# Patient Record
Sex: MALE | Race: WHITE | NOT HISPANIC OR LATINO | Employment: OTHER | URBAN - METROPOLITAN AREA
[De-identification: names, ages, dates, MRNs, and addresses within clinical notes are randomized per-mention and may not be internally consistent; named-entity substitution may affect disease eponyms.]

---

## 2017-03-30 ENCOUNTER — OFFICE VISIT (OUTPATIENT)
Dept: FAMILY MEDICINE | Facility: CLINIC | Age: 67
End: 2017-03-30
Payer: COMMERCIAL

## 2017-03-30 VITALS
HEART RATE: 68 BPM | HEIGHT: 67 IN | DIASTOLIC BLOOD PRESSURE: 80 MMHG | BODY MASS INDEX: 28.41 KG/M2 | TEMPERATURE: 97.4 F | WEIGHT: 181 LBS | SYSTOLIC BLOOD PRESSURE: 110 MMHG | RESPIRATION RATE: 16 BRPM

## 2017-03-30 DIAGNOSIS — Z13.6 SCREENING FOR ABDOMINAL AORTIC ANEURYSM: ICD-10-CM

## 2017-03-30 DIAGNOSIS — Z00.00 ROUTINE GENERAL MEDICAL EXAMINATION AT A HEALTH CARE FACILITY: ICD-10-CM

## 2017-03-30 DIAGNOSIS — T63.444S BEE STING REACTION, UNDETERMINED INTENT, SEQUELA: ICD-10-CM

## 2017-03-30 DIAGNOSIS — Z11.59 NEED FOR HEPATITIS C SCREENING TEST: Primary | ICD-10-CM

## 2017-03-30 DIAGNOSIS — Z12.5 SCREENING FOR PROSTATE CANCER: ICD-10-CM

## 2017-03-30 LAB
ALBUMIN SERPL-MCNC: 3.7 G/DL (ref 3.4–5)
ALP SERPL-CCNC: 73 U/L (ref 40–150)
ALT SERPL W P-5'-P-CCNC: 21 U/L (ref 0–70)
ANION GAP SERPL CALCULATED.3IONS-SCNC: 5 MMOL/L (ref 3–14)
AST SERPL W P-5'-P-CCNC: 17 U/L (ref 0–45)
BILIRUB SERPL-MCNC: 0.7 MG/DL (ref 0.2–1.3)
BUN SERPL-MCNC: 19 MG/DL (ref 7–30)
CALCIUM SERPL-MCNC: 8.6 MG/DL (ref 8.5–10.1)
CHLORIDE SERPL-SCNC: 103 MMOL/L (ref 94–109)
CO2 SERPL-SCNC: 30 MMOL/L (ref 20–32)
CREAT SERPL-MCNC: 0.96 MG/DL (ref 0.66–1.25)
GFR SERPL CREATININE-BSD FRML MDRD: 78 ML/MIN/1.7M2
GLUCOSE SERPL-MCNC: 96 MG/DL (ref 70–99)
HCV AB SERPL QL IA: NORMAL
POTASSIUM SERPL-SCNC: 4.4 MMOL/L (ref 3.4–5.3)
PROT SERPL-MCNC: 7.1 G/DL (ref 6.8–8.8)
PSA SERPL-ACNC: 2.42 UG/L (ref 0–4)
SODIUM SERPL-SCNC: 138 MMOL/L (ref 133–144)

## 2017-03-30 PROCEDURE — G0103 PSA SCREENING: HCPCS | Performed by: FAMILY MEDICINE

## 2017-03-30 PROCEDURE — 80053 COMPREHEN METABOLIC PANEL: CPT | Performed by: FAMILY MEDICINE

## 2017-03-30 PROCEDURE — 86803 HEPATITIS C AB TEST: CPT | Performed by: FAMILY MEDICINE

## 2017-03-30 PROCEDURE — 36415 COLL VENOUS BLD VENIPUNCTURE: CPT | Performed by: FAMILY MEDICINE

## 2017-03-30 PROCEDURE — 99397 PER PM REEVAL EST PAT 65+ YR: CPT | Performed by: FAMILY MEDICINE

## 2017-03-30 RX ORDER — EPINEPHRINE 0.3 MG/.3ML
0.3 INJECTION SUBCUTANEOUS PRN
Qty: 0.6 ML | Refills: 1 | Status: SHIPPED | OUTPATIENT
Start: 2017-03-30 | End: 2024-05-09

## 2017-03-30 NOTE — PROGRESS NOTES
SUBJECTIVE:                                                            Steffen Mitchell is a 66 year old male who presents for Preventive Visit. He comes in today for routine health examination he is doing quite well. We've gone over his current medications talked about laboratory studies today and gone overall his health maintenance.  click delete button to remove this line now  click delete button to remove this line now  Are you in the first 12 months of your Medicare Part B coverage?      Healthy Habits:  Annual Exam:  Getting at least 3 servings of Calcium per day:: Yes  Bi-annual eye exam:: Yes  Dental care twice a year:: Yes  Sleep apnea or symptoms of sleep apnea:: None  Diet:: Regular (no restrictions)  Frequency of exercise:: 2-3 days/week  Taking medications regularly:: Yes  Medication side effects:: Not applicable  Additional concerns today:: No  Q1: Little interest or pleasure in doing things: 0=Not at all  Q2: Feeling down, depressed or hopeless: 0=Not at all  PHQ-2 Score: 0  Duration of exercise:: 45-60 minutes    COGNITIVE SCREEN  1) Repeat 3 items (Banana, Sunrise, Chair)    2) Clock draw: NORMAL  3) 3 item recall:   Recalls 3 objects  Results: 3 items recalled: COGNITIVE IMPAIRMENT LESS LIKELY    Mini-CogTM Copyright S Olivier. Licensed by the author for use in [facility]; reprinted with permission (soob@.[email or web address]). All rights reserved.                Reviewed and updated as needed this visit by clinical staff  Tobacco  Allergies         Reviewed and updated as needed this visit by Provider        Social History   Substance Use Topics     Smoking status: Former Smoker     Years: 10.00     Quit date: 1/1/1975     Smokeless tobacco: Former User     Quit date: 1/1/1980     Alcohol use Yes      Comment: occ has wine with dinner       The patient does not drink >3 drinks per day nor >7 drinks per week.    Today's PHQ-2 Score:   PHQ-2 ( 1999 [employer]) 3/30/2017 3/27/2017   Q1: Little interest or  "pleasure in doing things 0 -   Q2: Feeling down, depressed or hopeless 0 -   PHQ-2 Score 0 -   Little interest or pleasure in doing things - Not at all   Feeling down, depressed or hopeless - Not at all   PHQ-2 Score - 0       Do you feel safe in your environment - Yes    Do you have a Health Care Directive?: yes    Current providers sharing in care for this patient include:   Patient Care Team:  Renny Rosado MD as PCP - General (Family Practice)      Hearing impairment: Yes,     Ability to successfully perform activities of daily living: Yes, no assistance needed     Fall risk:  Fallen 2 or more times in the past year?: No  Any fall with injury in the past year?: No    Home safety:  discussed      The following health maintenance items are reviewed in Epic and correct as of today:  Health Maintenance   Topic Date Due     HEPATITIS C SCREENING  06/01/1968     FALL RISK ASSESSMENT  06/01/2015     AORTIC ANEURYSM SCREENING (SYSTEM ASSIGNED)  06/01/2015     TETANUS IMMUNIZATION (SYSTEM ASSIGNED)  05/06/2017     ADVANCE DIRECTIVE PLANNING Q5 YRS (NO INBASKET)  07/11/2017     INFLUENZA VACCINE (SYSTEM ASSIGNED)  09/01/2017     LIPID SCREEN Q5 YR MALE (SYSTEM ASSIGNED)  05/05/2020     COLONOSCOPY Q5 YR INBASKET MESSAGE  05/13/2020     PNEUMOCOCCAL  Completed              ROS:  C: NEGATIVE for fever, chills, change in weight  E/M: NEGATIVE for ear, mouth and throat problems  R: NEGATIVE for significant cough or SOB  CV: NEGATIVE for chest pain, palpitations or peripheral edema    BP Readings from Last 3 Encounters:   03/30/17 110/80   09/16/16 137/82   09/11/16 (!) 151/97    Wt Readings from Last 3 Encounters:   03/30/17 181 lb (82.1 kg)   09/16/16 191 lb (86.6 kg)   05/13/15 185 lb (83.9 kg)                  OBJECTIVE:                                                            /80  Pulse 68  Temp 97.4  F (36.3  C) (Tympanic)  Resp 16  Ht 5' 7\" (1.702 m)  Wt 181 lb (82.1 kg)  BMI 28.35 kg/m2 Estimated " "body mass index is 28.35 kg/(m^2) as calculated from the following:    Height as of this encounter: 5' 7\" (1.702 m).    Weight as of this encounter: 181 lb (82.1 kg).  EXAM:   GENERAL: healthy, alert and no distress  NECK: no adenopathy, no asymmetry, masses, or scars and thyroid normal to palpation  RESP: lungs clear to auscultation - no rales, rhonchi or wheezes  CV: regular rate and rhythm, normal S1 S2, no S3 or S4, no murmur, click or rub, no peripheral edema and peripheral pulses strong  ABDOMEN: soft, nontender, no hepatosplenomegaly, no masses and bowel sounds normal  MS: no gross musculoskeletal defects noted, no edema    ASSESSMENT / PLAN:                                                            1. Need for hepatitis C screening test      2. Screening for prostate cancer   - Hepatitis C Screen Reflex to HCV RNA Quant and Genotype  - Comprehensive metabolic panel  - PSA, screen  - US AORTIC IMAGING [[id number]]; Future    3. Screening for abdominal aortic aneurysm      4. Bee sting reaction, undetermined intent, sequela    - EPINEPHrine (ADRENACLICK) 0.3 MG/0.3ML injection; Inject 0.3 mLs (0.3 mg) into the muscle as needed for anaphylaxis  Dispense: 0.6 mL; Refill: 1    End of Life Planning:  Patient currently has an advanced directive:     COUNSELING:  ASSESSMENT/PLAN:      ICD-10-CM    1. Need for hepatitis C screening test Z11.59    2. Screening for prostate cancer Z12.5 Hepatitis C Screen Reflex to HCV RNA Quant and Genotype     Comprehensive metabolic panel     PSA, screen     US AORTIC IMAGING [[id number]]   3. Screening for abdominal aortic aneurysm Z13.6    4. Bee sting reaction, undetermined intent, sequela T63.444S EPINEPHrine (ADRENACLICK) 0.3 MG/0.3ML injection       Patient Instructions     Preventive Health Recommendations:   Male Ages 65 and over    Yearly exam:             See your health care provider every year in order to  o   Review health changes.   o   Discuss preventive care.    o   " Review your medicines if your doctor has prescribed any.    Talk with your health care provider about whether you should have a test to screen for prostate cancer (PSA).    Every 3 years, have a diabetes test (fasting glucose). If you are at risk for diabetes, you should have this test more often.    Every 5 years, have a cholesterol test. Have this test more often if you are at risk for high cholesterol or heart disease.     Every 10 years, have a colonoscopy. Or, have a yearly FIT test (stool test). These exams will check for colon cancer.    Talk to with your health care provider about screening for Abdominal Aortic Aneurysm if you have a family history of AAA or have a history of smoking.    Shots:     Get a flu shot each year.     Get a tetanus shot every 10 years.     Talk to your doctor about your pneumonia vaccines. There are now two you should receive - Pneumovax (PPSV 23) and Prevnar (PCV 13).     Talk to your doctor about a shingles vaccine.     Talk to your doctor about the hepatitis B vaccine.  Nutrition:     Eat at least 5 servings of fruits and vegetables each day.     Eat whole-grain bread, whole-wheat pasta and brown rice instead of white grains and rice.     Talk to your provider about Calcium and Vitamin D.   Lifestyle    Exercise for at least 150 minutes a week (30 minutes a day, 5 days a week). This will help you control your weight and prevent disease.     Limit alcohol to one drink per day.     No smoking.     Wear sunscreen to prevent skin cancer.     See your dentist every six months for an exam and cleaning.     See your eye doctor every 1 to 2 years to screen for conditions such as glaucoma, macular degeneration, cataracts, etc   Preventive Health Recommendations:       Male Ages 65 and over    Yearly exam:             See your health care provider every year in order to  o   Review health changes.   o   Discuss preventive care.    o   Review your medicines if your doctor has prescribed  "any.  Talk with your health care provider about whether you should have a test to screen for prostate cancer (PSA).  Every 3 years, have a diabetes test (fasting glucose). If you are at risk for diabetes, you should have this test more often.  Every 5 years, have a cholesterol test. Have this test more often if you are at risk for high cholesterol or heart disease.   Every 10 years, have a colonoscopy. Or, have a yearly FIT test (stool test). These exams will check for colon cancer.  Talk to with your health care provider about screening for Abdominal Aortic Aneurysm if you have a family history of AAA or have a history of smoking.  Shots:   Get a flu shot each year.   Get a tetanus shot every 10 years.   Talk to your doctor about your pneumonia vaccines. There are now two you should receive - Pneumovax (PPSV 23) and Prevnar (PCV 13).  Talk to your doctor about a shingles vaccine.   Talk to your doctor about the hepatitis B vaccine.  Nutrition:   Eat at least 5 servings of fruits and vegetables each day.   Eat whole-grain bread, whole-wheat pasta and brown rice instead of white grains and rice.   Talk to your doctor about Calcium and Vitamin D.   Lifestyle  Exercise for at least 150 minutes a week (30 minutes a day, 5 days a week). This will help you control your weight and prevent disease.   Limit alcohol to one drink per day.   No smoking.   Wear sunscreen to prevent skin cancer.   See your dentist every six months for an exam and cleaning.   See your eye doctor every 1 to 2 years to screen for conditions such as glaucoma, macular degeneration and cataracts.  Your exam is normal.  I will call on labs  Come back one year  Get AAA screen done               Estimated body mass index is 28.35 kg/(m^2) as calculated from the following:    Height as of this encounter: 5' 7\" (1.702 m).    Weight as of this encounter: 181 lb (82.1 kg).     reports that he quit smoking about 42 years ago. He quit after 10.00 years of use. " He quit smokeless tobacco use about 37 years ago.      Appropriate preventive services were discussed with this patient, including applicable screening as appropriate for cardiovascular disease, diabetes, osteopenia/osteoporosis, and glaucoma.  As appropriate for age/gender, discussed screening for colorectal cancer, prostate cancer, breast cancer, and cervical cancer. Checklist reviewing preventive services available has been given to the patient.    Reviewed patients plan of care and provided an AVS. The  for Steffen meets the Care Plan requirement. This Care Plan has been established and reviewed with the     Counseling Resources:  ATP IV Guidelines  Pooled Cohorts Equation Calculator  Breast Cancer Risk Calculator  FRAX Risk Assessment  ICSI Preventive Guidelines  Dietary Guidelines for Americans, 2010  [employer]'s MyPlate  ASA Prophylaxis  Lung CA Screening    Renny Garcia MD  [facility]  Answers for HPI/ROS submitted by the patient on 3/27/2017   Annual Exam:  Getting at least 3 servings of Calcium per day:: Yes  Bi-annual eye exam:: Yes  Dental care twice a year:: Yes  Sleep apnea or symptoms of sleep apnea:: None  Diet:: Regular (no restrictions)  Frequency of exercise:: 2-3 days/week  Taking medications regularly:: Yes  Medication side effects:: Not applicable  Additional concerns today:: No  Q1: Little interest or pleasure in doing things: 0=Not at all  Q2: Feeling down, depressed or hopeless: 0=Not at all  PHQ-2 Score: 0  Duration of exercise:: 45-60 minutes

## 2017-03-30 NOTE — PATIENT INSTRUCTIONS
Preventive Health Recommendations:   Male Ages 65 and over    Yearly exam:             See your health care provider every year in order to  o   Review health changes.   o   Discuss preventive care.    o   Review your medicines if your doctor has prescribed any.    Talk with your health care provider about whether you should have a test to screen for prostate cancer (PSA).    Every 3 years, have a diabetes test (fasting glucose). If you are at risk for diabetes, you should have this test more often.    Every 5 years, have a cholesterol test. Have this test more often if you are at risk for high cholesterol or heart disease.     Every 10 years, have a colonoscopy. Or, have a yearly FIT test (stool test). These exams will check for colon cancer.    Talk to with your health care provider about screening for Abdominal Aortic Aneurysm if you have a family history of AAA or have a history of smoking.    Shots:     Get a flu shot each year.     Get a tetanus shot every 10 years.     Talk to your doctor about your pneumonia vaccines. There are now two you should receive - Pneumovax (PPSV 23) and Prevnar (PCV 13).     Talk to your doctor about a shingles vaccine.     Talk to your doctor about the hepatitis B vaccine.  Nutrition:     Eat at least 5 servings of fruits and vegetables each day.     Eat whole-grain bread, whole-wheat pasta and brown rice instead of white grains and rice.     Talk to your provider about Calcium and Vitamin D.   Lifestyle    Exercise for at least 150 minutes a week (30 minutes a day, 5 days a week). This will help you control your weight and prevent disease.     Limit alcohol to one drink per day.     No smoking.     Wear sunscreen to prevent skin cancer.     See your dentist every six months for an exam and cleaning.     See your eye doctor every 1 to 2 years to screen for conditions such as glaucoma, macular degeneration, cataracts, etc   Preventive Health Recommendations:       Male Ages 65  and over    Yearly exam:             See your health care provider every year in order to  o   Review health changes.   o   Discuss preventive care.    o   Review your medicines if your doctor has prescribed any.  Talk with your health care provider about whether you should have a test to screen for prostate cancer (PSA).  Every 3 years, have a diabetes test (fasting glucose). If you are at risk for diabetes, you should have this test more often.  Every 5 years, have a cholesterol test. Have this test more often if you are at risk for high cholesterol or heart disease.   Every 10 years, have a colonoscopy. Or, have a yearly FIT test (stool test). These exams will check for colon cancer.  Talk to with your health care provider about screening for Abdominal Aortic Aneurysm if you have a family history of AAA or have a history of smoking.  Shots:   Get a flu shot each year.   Get a tetanus shot every 10 years.   Talk to your doctor about your pneumonia vaccines. There are now two you should receive - Pneumovax (PPSV 23) and Prevnar (PCV 13).  Talk to your doctor about a shingles vaccine.   Talk to your doctor about the hepatitis B vaccine.  Nutrition:   Eat at least 5 servings of fruits and vegetables each day.   Eat whole-grain bread, whole-wheat pasta and brown rice instead of white grains and rice.   Talk to your doctor about Calcium and Vitamin D.   Lifestyle  Exercise for at least 150 minutes a week (30 minutes a day, 5 days a week). This will help you control your weight and prevent disease.   Limit alcohol to one drink per day.   No smoking.   Wear sunscreen to prevent skin cancer.   See your dentist every six months for an exam and cleaning.   See your eye doctor every 1 to 2 years to screen for conditions such as glaucoma, macular degeneration and cataracts.  Your exam is normal.  I will call on labs  Come back one year  Get AAA screen done

## 2017-03-30 NOTE — MR AVS SNAPSHOT
After Visit Summary   3/30/2017    Steffen Mitchell    MRN: [id number]           Patient Information     Date Of Birth          1950        Visit Information        Provider Department      3/30/2017 8:40 AM Renny Garcia MD [facility]        Today's Diagnoses     Need for hepatitis C screening test    -  1    Screening for prostate cancer        Screening for abdominal aortic aneurysm          Care Instructions      Preventive Health Recommendations:   Male Ages 65 and over    Yearly exam:             See your health care provider every year in order to  o   Review health changes.   o   Discuss preventive care.    o   Review your medicines if your doctor has prescribed any.    Talk with your health care provider about whether you should have a test to screen for prostate cancer (PSA).    Every 3 years, have a diabetes test (fasting glucose). If you are at risk for diabetes, you should have this test more often.    Every 5 years, have a cholesterol test. Have this test more often if you are at risk for high cholesterol or heart disease.     Every 10 years, have a colonoscopy. Or, have a yearly FIT test (stool test). These exams will check for colon cancer.    Talk to with your health care provider about screening for Abdominal Aortic Aneurysm if you have a family history of AAA or have a history of smoking.    Shots:     Get a flu shot each year.     Get a tetanus shot every 10 years.     Talk to your doctor about your pneumonia vaccines. There are now two you should receive - Pneumovax (PPSV 23) and Prevnar (PCV 13).     Talk to your doctor about a shingles vaccine.     Talk to your doctor about the hepatitis B vaccine.  Nutrition:     Eat at least 5 servings of fruits and vegetables each day.     Eat whole-grain bread, whole-wheat pasta and brown rice instead of white grains and rice.     Talk to your provider about Calcium and Vitamin D.   Lifestyle    Exercise for  at least 150 minutes a week (30 minutes a day, 5 days a week). This will help you control your weight and prevent disease.     Limit alcohol to one drink per day.     No smoking.     Wear sunscreen to prevent skin cancer.     See your dentist every six months for an exam and cleaning.     See your eye doctor every 1 to 2 years to screen for conditions such as glaucoma, macular degeneration, cataracts, etc   Preventive Health Recommendations:       Male Ages 65 and over    Yearly exam:             See your health care provider every year in order to  o   Review health changes.   o   Discuss preventive care.    o   Review your medicines if your doctor has prescribed any.  Talk with your health care provider about whether you should have a test to screen for prostate cancer (PSA).  Every 3 years, have a diabetes test (fasting glucose). If you are at risk for diabetes, you should have this test more often.  Every 5 years, have a cholesterol test. Have this test more often if you are at risk for high cholesterol or heart disease.   Every 10 years, have a colonoscopy. Or, have a yearly FIT test (stool test). These exams will check for colon cancer.  Talk to with your health care provider about screening for Abdominal Aortic Aneurysm if you have a family history of AAA or have a history of smoking.  Shots:   Get a flu shot each year.   Get a tetanus shot every 10 years.   Talk to your doctor about your pneumonia vaccines. There are now two you should receive - Pneumovax (PPSV 23) and Prevnar (PCV 13).  Talk to your doctor about a shingles vaccine.   Talk to your doctor about the hepatitis B vaccine.  Nutrition:   Eat at least 5 servings of fruits and vegetables each day.   Eat whole-grain bread, whole-wheat pasta and brown rice instead of white grains and rice.   Talk to your doctor about Calcium and Vitamin D.   Lifestyle  Exercise for at least 150 minutes a week (30 minutes a day, 5 days a week). This will help you  control your weight and prevent disease.   Limit alcohol to one drink per day.   No smoking.   Wear sunscreen to prevent skin cancer.   See your dentist every six months for an exam and cleaning.   See your eye doctor every 1 to 2 years to screen for conditions such as glaucoma, macular degeneration and cataracts.  Your exam is normal.  I will call on labs  Come back one year  Get AAA screen done         Follow-ups after your visit        Future tests that were ordered for you today     Open Future Orders        Priority Expected Expires Ordered    US AORTIC IMAGING [[id number]] Routine  3/30/2018 3/30/2017            Who to contact     If you have questions or need follow up information about today's clinic visit or your schedule please contact [facility] directly at [phone].  Normal or non-critical lab and imaging results will be communicated to you by MyChart, letter or phone within 4 business days after the clinic has received the results. If you do not hear from us within 7 days, please contact the clinic through AllSource Analysishart or phone. If you have a critical or abnormal lab result, we will notify you by phone as soon as possible.  Submit refill requests through NextInput or call your pharmacy and they will forward the refill request to us. Please allow 3 business days for your refill to be completed.          Additional Information About Your Visit        AllSource Analysisharebookpie Information     NextInput gives you secure access to your electronic health record. If you see a primary care provider, you can also send messages to your care team and make appointments. If you have questions, please call your primary care clinic.  If you do not have a primary care provider, please call [phone] and they will assist you.        Care EveryWhere ID     This is your Care EveryWhere ID. This could be used by other organizations to access your [facility] medical records  [id number]        Your Vitals Were     Pulse  "Temperature Respirations Height BMI (Body Mass Index)       68 97.4  F (36.3  C) (Tympanic) 16 5' 7\" (1.702 m) 28.35 kg/m2        Blood Pressure from Last 3 Encounters:   03/30/17 110/80   09/16/16 137/82   09/11/16 (!) 151/97    Weight from Last 3 Encounters:   03/30/17 181 lb (82.1 kg)   09/16/16 191 lb (86.6 kg)   05/13/15 185 lb (83.9 kg)              We Performed the Following     Comprehensive metabolic panel     Hepatitis C Screen Reflex to HCV RNA Quant and Genotype     PSA, screen          Today's Medication Changes          These changes are accurate as of: 3/30/17  9:34 AM.  If you have any questions, ask your nurse or doctor.               These medicines have changed or have updated prescriptions.        Dose/Directions    ciclopirox 0.77 % cream   Commonly known as:  LOPROX   This may have changed:    - when to take this  - reasons to take this  - additional instructions   Used for:  Dermatitis, seborrheic, Bee allergy status, Bee sting allergy        Apply topically 2 times daily Apply to sites on face at bedtime   Quantity:  90 g   Refills:  3       EPIPEN 2-BON 0.3 MG/0.3ML injection   This may have changed:  additional instructions   Used for:  Bee sting allergy, Dermatitis, seborrheic, Bee allergy status   Generic drug:  EPINEPHrine        Dose:  0.3 mg   Inject 0.3 mLs (0.3 mg) into the muscle once as needed for anaphylaxis   Quantity:  1 each   Refills:  6                Primary Care Provider Office Phone # Fax #    Renny Rosado [phone]       [facility] [address] [address]        Thank you!     Thank you for choosing [facility]  for your care. Our goal is always to provide you with excellent care. Hearing back from our patients is one way we can continue to improve our services. Please take a few minutes to complete the written survey that you may receive in the mail after your visit with us. Thank you!             Your " Updated Medication List - Protect others around you: Learn how to safely use, store and throw away your medicines at www.disposemymeds.org.          This list is accurate as of: 3/30/17  9:34 AM.  Always use your most recent med list.                   Brand Name Dispense Instructions for use    ALEVE 220 MG tablet   Generic drug:  naproxen sodium      1 TABLET EVERY 12 HOURS AS NEEDED       aspirin 81 MG tablet     100    1 tab po QD (Once per day)       ciclopirox 0.77 % cream    LOPROX    90 g    Apply topically 2 times daily Apply to sites on face at bedtime       clobetasol 0.05 % external solution    TEMOVATE    100 mL    Apply topically daily Apply to site son scalp daily       EPIPEN 2-BON 0.3 MG/0.3ML injection   Generic drug:  EPINEPHrine     1 each    Inject 0.3 mLs (0.3 mg) into the muscle once as needed for anaphylaxis       ketoconazole 2 % shampoo    NIZORAL    240 mL    Apply to the affected area and wash off after 5 minutes.

## 2017-10-26 ENCOUNTER — ALLIED HEALTH/NURSE VISIT (OUTPATIENT)
Dept: FAMILY MEDICINE | Facility: CLINIC | Age: 67
End: 2017-10-26
Payer: COMMERCIAL

## 2017-10-26 DIAGNOSIS — Z23 NEED FOR PROPHYLACTIC VACCINATION AND INOCULATION AGAINST INFLUENZA: Primary | ICD-10-CM

## 2017-10-26 PROCEDURE — 99207 ZZC NO CHARGE LOS: CPT

## 2017-10-26 PROCEDURE — 90662 IIV NO PRSV INCREASED AG IM: CPT

## 2017-10-26 PROCEDURE — G0008 ADMIN INFLUENZA VIRUS VAC: HCPCS

## 2017-10-26 NOTE — MR AVS SNAPSHOT
After Visit Summary   10/26/2017    Steffen Mitchell    MRN: [id number]           Patient Information     Date Of Birth          1950        Visit Information        Provider Department      10/26/2017 3:10 PM [facility] FLU SHOT [facility]        Today's Diagnoses     Need for prophylactic vaccination and inoculation against influenza    -  1       Follow-ups after your visit        Who to contact     If you have questions or need follow up information about today's clinic visit or your schedule please contact [facility] directly at [phone].  Normal or non-critical lab and imaging results will be communicated to you by ScanDigitalhart, letter or phone within 4 business days after the clinic has received the results. If you do not hear from us within 7 days, please contact the clinic through BonaYout or phone. If you have a critical or abnormal lab result, we will notify you by phone as soon as possible.  Submit refill requests through BAUNAT or call your pharmacy and they will forward the refill request to us. Please allow 3 business days for your refill to be completed.          Additional Information About Your Visit        MyChart Information     BAUNAT gives you secure access to your electronic health record. If you see a primary care provider, you can also send messages to your care team and make appointments. If you have questions, please call your primary care clinic.  If you do not have a primary care provider, please call [phone] and they will assist you.        Care EveryWhere ID     This is your Care EveryWhere ID. This could be used by other organizations to access your [facility] medical records  [id number]         Blood Pressure from Last 3 Encounters:   03/30/17 110/80   09/16/16 137/82   09/11/16 (!) 151/97    Weight from Last 3 Encounters:   03/30/17 181 lb (82.1 kg)   09/16/16 191 lb (86.6 kg)   05/13/15 185 lb (83.9 kg)              We Performed the  Following     ADMIN INFLUENZA (For MEDICARE Patients ONLY) []     FLU VACCINE, INCREASED ANTIGEN, PRESV FREE, AGE 65+ [34165]          Today's Medication Changes          These changes are accurate as of: 10/26/17  3:11 PM.  If you have any questions, ask your nurse or doctor.               These medicines have changed or have updated prescriptions.        Dose/Directions    ciclopirox 0.77 % cream   Commonly known as:  LOPROX   This may have changed:    - when to take this  - reasons to take this  - additional instructions   Used for:  Dermatitis, seborrheic, Bee allergy status, Bee sting allergy        Apply topically 2 times daily Apply to sites on face at bedtime   Quantity:  90 g   Refills:  3       * EPIPEN 2-BON 0.3 MG/0.3ML injection 2-pack   This may have changed:  additional instructions   Used for:  Bee sting allergy, Dermatitis, seborrheic, Bee allergy status   Generic drug:  EPINEPHrine        Dose:  0.3 mg   Inject 0.3 mLs (0.3 mg) into the muscle once as needed for anaphylaxis   Quantity:  1 each   Refills:  6       * EPINEPHrine 0.3 MG/0.3ML injection 2-pack   Commonly known as:  ADRENACLICK   This may have changed:  Another medication with the same name was changed. Make sure you understand how and when to take each.   Used for:  Bee sting reaction, undetermined intent, sequela        Dose:  0.3 mg   Inject 0.3 mLs (0.3 mg) into the muscle as needed for anaphylaxis   Quantity:  0.6 mL   Refills:  1       * Notice:  This list has 2 medication(s) that are the same as other medications prescribed for you. Read the directions carefully, and ask your doctor or other care provider to review them with you.             Primary Care Provider Office Phone # Fax #    Renny Rosado [phone] [address] [place] [address]        Equal Access to Services     LINDA ECHEVERRIA : alpesh Garrido qaybta kaalmada adeegyada, waxay idiin hayaan adeeg  ronievytmei dominguezaan ah. So [facility] [phone].    ATENCIÓN: Si scottla jorge, tiene a duncan disposición servicios gratuitos de asistencia lingüística. Gianfranco al [phone].    We comply with applicable federal civil rights laws and [place] laws. We do not discriminate on the basis of race, color, national origin, age, disability, sex, sexual orientation, or gender identity.            Thank you!     Thank you for choosing [facility]  for your care. Our goal is always to provide you with excellent care. Hearing back from our patients is one way we can continue to improve our services. Please take a few minutes to complete the written survey that you may receive in the mail after your visit with us. Thank you!             Your Updated Medication List - Protect others around you: Learn how to safely use, store and throw away your medicines at www.disposemymeds.org.          This list is accurate as of: 10/26/17  3:11 PM.  Always use your most recent med list.                   Brand Name Dispense Instructions for use Diagnosis    ALEVE 220 MG tablet   Generic drug:  naproxen sodium      1 TABLET EVERY 12 HOURS AS NEEDED        aspirin 81 MG tablet     100    1 tab po QD (Once per day)    Fam hx-cardiovas dis NEC       ciclopirox 0.77 % cream    LOPROX    90 g    Apply topically 2 times daily Apply to sites on face at bedtime    Dermatitis, seborrheic, Bee allergy status, Bee sting allergy       clobetasol 0.05 % external solution    TEMOVATE    100 mL    Apply topically daily Apply to site son scalp daily    Dermatitis, seborrheic, Bee allergy status, Bee sting allergy       * EPIPEN 2-BON 0.3 MG/0.3ML injection 2-pack   Generic drug:  EPINEPHrine     1 each    Inject 0.3 mLs (0.3 mg) into the muscle once as needed for anaphylaxis    Bee sting allergy, Dermatitis, seborrheic, Bee allergy status       * EPINEPHrine 0.3 MG/0.3ML injection 2-pack    ADRENACLICK    0.6 mL    Inject 0.3 mLs (0.3 mg) into the muscle  as needed for anaphylaxis    Bee sting reaction, undetermined intent, sequela       ketoconazole 2 % shampoo    NIZORAL    240 mL    Apply to the affected area and wash off after 5 minutes.    Dermatitis, seborrheic, Bee allergy status, Bee sting allergy       * Notice:  This list has 2 medication(s) that are the same as other medications prescribed for you. Read the directions carefully, and ask your doctor or other care provider to review them with you.

## 2017-10-26 NOTE — PROGRESS NOTES

## 2018-02-22 ENCOUNTER — RADIANT APPOINTMENT (OUTPATIENT)
Dept: ULTRASOUND IMAGING | Facility: CLINIC | Age: 68
End: 2018-02-22
Attending: FAMILY MEDICINE
Payer: COMMERCIAL

## 2018-02-22 DIAGNOSIS — Z12.5 SCREENING FOR PROSTATE CANCER: ICD-10-CM

## 2018-02-22 PROCEDURE — 76775 US EXAM ABDO BACK WALL LIM: CPT

## 2018-10-31 ENCOUNTER — ALLIED HEALTH/NURSE VISIT (OUTPATIENT)
Dept: FAMILY MEDICINE | Facility: CLINIC | Age: 68
End: 2018-10-31
Payer: COMMERCIAL

## 2018-10-31 DIAGNOSIS — Z23 NEED FOR PROPHYLACTIC VACCINATION AND INOCULATION AGAINST INFLUENZA: Primary | ICD-10-CM

## 2018-10-31 PROCEDURE — G0008 ADMIN INFLUENZA VIRUS VAC: HCPCS

## 2018-10-31 PROCEDURE — 90662 IIV NO PRSV INCREASED AG IM: CPT

## 2018-10-31 NOTE — MR AVS SNAPSHOT
After Visit Summary   10/31/2018    Steffen Mitchell    MRN: [id number]           Patient Information     Date Of Birth          1950        Visit Information        Provider Department      10/31/2018 10:30 AM [place] [facility]INA/LPN [facility]        Today's Diagnoses     Need for prophylactic vaccination and inoculation against influenza    -  1       Follow-ups after your visit        Who to contact     If you have questions or need follow up information about today's clinic visit or your schedule please contact [facility] directly at [phone].  Normal or non-critical lab and imaging results will be communicated to you by Watkins Hirehart, letter or phone within 4 business days after the clinic has received the results. If you do not hear from us within 7 days, please contact the clinic through Phraxist or phone. If you have a critical or abnormal lab result, we will notify you by phone as soon as possible.  Submit refill requests through cisimple or call your pharmacy and they will forward the refill request to us. Please allow 3 business days for your refill to be completed.          Additional Information About Your Visit        MyChart Information     cisimple gives you secure access to your electronic health record. If you see a primary care provider, you can also send messages to your care team and make appointments. If you have questions, please call your primary care clinic.  If you do not have a primary care provider, please call [phone] and they will assist you.        Care EveryWhere ID     This is your Care EveryWhere ID. This could be used by other organizations to access your [facility] medical records  [id number]         Blood Pressure from Last 3 Encounters:   03/30/17 110/80   09/16/16 137/82   09/11/16 (!) 151/97    Weight from Last 3 Encounters:   03/30/17 181 lb (82.1 kg)   09/16/16 191 lb (86.6 kg)   05/13/15 185 lb (83.9 kg)              We Performed  the Following     FLU VACCINE, INCREASED ANTIGEN, PRESV FREE, AGE 65+ [84731]     Vaccine Administration, Initial [52271]          Today's Medication Changes          These changes are accurate as of 10/31/18 11:16 AM.  If you have any questions, ask your nurse or doctor.               These medicines have changed or have updated prescriptions.        Dose/Directions    ciclopirox 0.77 % cream   Commonly known as:  LOPROX   This may have changed:    - when to take this  - reasons to take this  - additional instructions   Used for:  Dermatitis, seborrheic, Bee allergy status, Bee sting allergy        Apply topically 2 times daily Apply to sites on face at bedtime   Quantity:  90 g   Refills:  3       * EPIPEN 2-BON 0.3 MG/0.3ML injection 2-pack   This may have changed:  additional instructions   Used for:  Bee sting allergy, Dermatitis, seborrheic, Bee allergy status   Generic drug:  EPINEPHrine        Dose:  0.3 mg   Inject 0.3 mLs (0.3 mg) into the muscle once as needed for anaphylaxis   Quantity:  1 each   Refills:  6       * EPINEPHrine 0.3 MG/0.3ML injection 2-pack   Commonly known as:  ADRENACLICK   This may have changed:  Another medication with the same name was changed. Make sure you understand how and when to take each.   Used for:  Bee sting reaction, undetermined intent, sequela        Dose:  0.3 mg   Inject 0.3 mLs (0.3 mg) into the muscle as needed for anaphylaxis   Quantity:  0.6 mL   Refills:  1       * Notice:  This list has 2 medication(s) that are the same as other medications prescribed for you. Read the directions carefully, and ask your doctor or other care provider to review them with you.             Primary Care Provider Office Phone # Fax #    Renny Rosado [phone] [address] [place] [address]        Equal Access to Services     LINDA ECHEVERRIA AH: Halima Thomas, alpesh rivera, berna hernandez  lajuanito falcon. So [facility] [phone].    ATENCIÓN: Si habla jorge, tiene a duncan disposición servicios gratuitos de asistencia lingüística. Gianfranco campuzano [phone].    We comply with applicable federal civil rights laws and [place] laws. We do not discriminate on the basis of race, color, national origin, age, disability, sex, sexual orientation, or gender identity.            Thank you!     Thank you for choosing [facility]  for your care. Our goal is always to provide you with excellent care. Hearing back from our patients is one way we can continue to improve our services. Please take a few minutes to complete the written survey that you may receive in the mail after your visit with us. Thank you!             Your Updated Medication List - Protect others around you: Learn how to safely use, store and throw away your medicines at www.disposemymeds.org.          This list is accurate as of 10/31/18 11:16 AM.  Always use your most recent med list.                   Brand Name Dispense Instructions for use Diagnosis    ALEVE 220 MG tablet   Generic drug:  naproxen sodium      1 TABLET EVERY 12 HOURS AS NEEDED        aspirin 81 MG tablet     100    1 tab po QD (Once per day)    Fam hx-cardiovas dis NEC       ciclopirox 0.77 % cream    LOPROX    90 g    Apply topically 2 times daily Apply to sites on face at bedtime    Dermatitis, seborrheic, Bee allergy status, Bee sting allergy       clobetasol 0.05 % external solution    TEMOVATE    100 mL    Apply topically daily Apply to site son scalp daily    Dermatitis, seborrheic, Bee allergy status, Bee sting allergy       * EPIPEN 2-BON 0.3 MG/0.3ML injection 2-pack   Generic drug:  EPINEPHrine     1 each    Inject 0.3 mLs (0.3 mg) into the muscle once as needed for anaphylaxis    Bee sting allergy, Dermatitis, seborrheic, Bee allergy status       * EPINEPHrine 0.3 MG/0.3ML injection 2-pack    ADRENACLICK    0.6 mL    Inject 0.3 mLs (0.3 mg) into the muscle as  needed for anaphylaxis    Bee sting reaction, undetermined intent, sequela       ketoconazole 2 % shampoo    NIZORAL    240 mL    Apply to the affected area and wash off after 5 minutes.    Dermatitis, seborrheic, Bee allergy status, Bee sting allergy       * Notice:  This list has 2 medication(s) that are the same as other medications prescribed for you. Read the directions carefully, and ask your doctor or other care provider to review them with you.

## 2018-10-31 NOTE — PROGRESS NOTES
Injectable Influenza Immunization Documentation    1.  Is the person to be vaccinated sick today?   No    2. Does the person to be vaccinated have an allergy to a component   of the vaccine?   No  Egg Allergy Algorithm Link    3. Has the person to be vaccinated ever had a serious reaction   to influenza vaccine in the past?   No    4. Has the person to be vaccinated ever had Guillain-Barré syndrome?   No    Form completed by Jane Jacobo CMA    Prior to injection verified patient identity using patient's name and date of birth.  Due to injection administration, patient instructed to remain in clinic for 15 minutes  afterwards, and to report any adverse reaction to me immediately.    Jane Jacobo CMA

## 2019-02-12 ENCOUNTER — OFFICE VISIT (OUTPATIENT)
Dept: FAMILY MEDICINE | Facility: CLINIC | Age: 69
End: 2019-02-12
Payer: COMMERCIAL

## 2019-02-12 VITALS
HEIGHT: 68 IN | WEIGHT: 191 LBS | BODY MASS INDEX: 28.95 KG/M2 | HEART RATE: 68 BPM | RESPIRATION RATE: 18 BRPM | SYSTOLIC BLOOD PRESSURE: 136 MMHG | DIASTOLIC BLOOD PRESSURE: 88 MMHG | TEMPERATURE: 97.4 F

## 2019-02-12 DIAGNOSIS — Z13.220 LIPID SCREENING: Primary | ICD-10-CM

## 2019-02-12 DIAGNOSIS — Z91.030 BEE STING ALLERGY: ICD-10-CM

## 2019-02-12 DIAGNOSIS — Z12.5 SCREENING FOR PROSTATE CANCER: ICD-10-CM

## 2019-02-12 DIAGNOSIS — L21.9 DERMATITIS, SEBORRHEIC: ICD-10-CM

## 2019-02-12 DIAGNOSIS — Z91.030 BEE ALLERGY STATUS: ICD-10-CM

## 2019-02-12 LAB
ALBUMIN SERPL-MCNC: 3.9 G/DL (ref 3.4–5)
ALP SERPL-CCNC: 75 U/L (ref 40–150)
ALT SERPL W P-5'-P-CCNC: 33 U/L (ref 0–70)
ANION GAP SERPL CALCULATED.3IONS-SCNC: 4 MMOL/L (ref 3–14)
AST SERPL W P-5'-P-CCNC: 20 U/L (ref 0–45)
BILIRUB SERPL-MCNC: 0.6 MG/DL (ref 0.2–1.3)
BUN SERPL-MCNC: 16 MG/DL (ref 7–30)
CALCIUM SERPL-MCNC: 8.8 MG/DL (ref 8.5–10.1)
CHLORIDE SERPL-SCNC: 102 MMOL/L (ref 94–109)
CHOLEST SERPL-MCNC: 198 MG/DL
CO2 SERPL-SCNC: 29 MMOL/L (ref 20–32)
CREAT SERPL-MCNC: 1.01 MG/DL (ref 0.66–1.25)
GFR SERPL CREATININE-BSD FRML MDRD: 76 ML/MIN/{1.73_M2}
GLUCOSE SERPL-MCNC: 102 MG/DL (ref 70–99)
HDLC SERPL-MCNC: 41 MG/DL
LDLC SERPL CALC-MCNC: 116 MG/DL
NONHDLC SERPL-MCNC: 157 MG/DL
POTASSIUM SERPL-SCNC: 4.6 MMOL/L (ref 3.4–5.3)
PROT SERPL-MCNC: 7.4 G/DL (ref 6.8–8.8)
SODIUM SERPL-SCNC: 135 MMOL/L (ref 133–144)
TRIGL SERPL-MCNC: 206 MG/DL

## 2019-02-12 PROCEDURE — 99000 SPECIMEN HANDLING OFFICE-LAB: CPT | Performed by: FAMILY MEDICINE

## 2019-02-12 PROCEDURE — 80061 LIPID PANEL: CPT | Performed by: FAMILY MEDICINE

## 2019-02-12 PROCEDURE — 36415 COLL VENOUS BLD VENIPUNCTURE: CPT | Performed by: FAMILY MEDICINE

## 2019-02-12 PROCEDURE — G0438 PPPS, INITIAL VISIT: HCPCS | Performed by: FAMILY MEDICINE

## 2019-02-12 PROCEDURE — 84153 ASSAY OF PSA TOTAL: CPT | Mod: 90 | Performed by: FAMILY MEDICINE

## 2019-02-12 PROCEDURE — 80053 COMPREHEN METABOLIC PANEL: CPT | Performed by: FAMILY MEDICINE

## 2019-02-12 PROCEDURE — 84154 ASSAY OF PSA FREE: CPT | Mod: 90 | Performed by: FAMILY MEDICINE

## 2019-02-12 RX ORDER — CICLOPIROX OLAMINE 7.7 MG/G
CREAM TOPICAL 2 TIMES DAILY
Qty: 90 G | Refills: 3 | Status: SHIPPED | OUTPATIENT
Start: 2019-02-12 | End: 2023-10-26

## 2019-02-12 RX ORDER — KETOCONAZOLE 20 MG/ML
SHAMPOO TOPICAL
Qty: 240 ML | Refills: 11 | Status: SHIPPED | OUTPATIENT
Start: 2019-02-12 | End: 2023-10-26

## 2019-02-12 RX ORDER — CLOBETASOL PROPIONATE 0.5 MG/ML
SOLUTION TOPICAL DAILY
Qty: 100 ML | Refills: 3 | Status: SHIPPED | OUTPATIENT
Start: 2019-02-12 | End: 2023-10-26

## 2019-02-12 ASSESSMENT — MIFFLIN-ST. JEOR: SCORE: 1602.93

## 2019-02-12 NOTE — PROGRESS NOTES
"  SUBJECTIVE:   Steffen Mitchell is a 68 year old male who presents for Preventive Visit.       Are you in the first 12 months of your Medicare Part B coverage?  No    Physical Health:    In general, how would you rate your overall physical health? excellent    Outside of work, how many days during the week do you exercise? 2-3 days/week    Outside of work, approximately how many minutes a day do you exercise?    If you drink alcohol do you typically have >3 drinks per day or >7 drinks per week? No    Do you usually eat at least 4 servings of fruit and vegetables a day, include whole grains & fiber and avoid regularly eating high fat or \"junk\" foods? Yes    Do you have any problems taking medications regularly?  na    Do you have any side effects from medications? not applicable    Needs assistance for the following daily activities: no assistance needed    Which of the following safety concerns are present in your home?  discussed     Hearing impairment: Yes,     In the past 6 months, have you been bothered by leaking of urine? no    Mental Health:    In general, how would you rate your overall mental or emotional health? excellent  PHQ-2 Score:      Do you feel safe in your environment? YES    Do you have a Health Care Directive? Yes: Patient states has Advance Directive and will bring in a copy to clinic.    Additional concerns to address?      Fall risk:       Cognitive Screenin) Repeat 3 items (Leader, Season, Table)    2) Clock draw: NORMAL  3) 3 item recall: Recalls NO objects   Results: 0 items recalled: PROBABLE COGNITIVE IMPAIRMENT, **INFORM PROVIDER**    Mini-CogTM Copyright SUELLEN Aguayo. Licensed by the author for use in [facility]; reprinted with permission (olman@.[facility]). All rights reserved.      Do you have sleep apnea, excessive snoring or daytime drowsiness?: no            Reviewed and updated as needed this visit by clinical staff         Reviewed and updated as needed this visit by " Provider        Social History     Tobacco Use     Smoking status: Former Smoker     Years: 10.00     Last attempt to quit: 1975     Years since quittin.1     Smokeless tobacco: Former User     Quit date: 1980   Substance Use Topics     Alcohol use: Yes     Comment: occ has wine with dinner                           Current providers sharing in care for this patient include:   Patient Care Team:  Renny Rosado MD as PCP - General (Family Practice)  Renny Garcia MD as PCP - Assigned PCP    The following health maintenance items are reviewed in Epic and correct as of today:  Health Maintenance   Topic Date Due     ZOSTER IMMUNIZATION (1 of 2) 2000     DTAP/TDAP/TD IMMUNIZATION (2 - Td) 2009     ADVANCE DIRECTIVE PLANNING Q5 YRS  2017     FALL RISK ASSESSMENT  2018     PHQ-2 Q1 YR  2018     LIPID SCREEN Q5 YR MALE (SYSTEM ASSIGNED)  2020     COLONOSCOPY Q5 YR  2020     INFLUENZA VACCINE  Completed     PNEUMOCOCCAL IMMUNIZATION 65+ LOW/MEDIUM RISK  Completed     AORTIC ANEURYSM SCREENING (SYSTEM ASSIGNED)  Completed     HEPATITIS C SCREENING  Completed     IPV IMMUNIZATION  Aged Out     MENINGITIS IMMUNIZATION  Aged Out     Labs reviewed in EPIC  BP Readings from Last 3 Encounters:   19 136/88   17 110/80   16 137/82    Wt Readings from Last 3 Encounters:   19 86.6 kg (191 lb)   17 82.1 kg (181 lb)   16 86.6 kg (191 lb)                  Patient Active Problem List   Diagnosis     FAMILY HISTORY OF CARDIOVASC DIS (ISCHEMIC)     ALLERGY   **  SEE ALSO ALLERGIC REAC BEE STING     FAMILY HISTORY OF DIABETES MELLITUS     Sensorineural hearing loss     Subjective tinnitus     CARDIOVASCULAR SCREENING; LDL GOAL LESS THAN 160     Advanced directives, counseling/discussion     Family history of diabetes mellitus (DM)     Past Surgical History:   Procedure Laterality Date     COLONOSCOPY N/A 2015    Procedure:  COLONOSCOPY;  Surgeon: Carlos Ovalles MD;  Location: [facility]     SURGICAL HISTORY OF -   2003     Colonoscopy and polypectomy with snare     SURGICAL HISTORY OF -   1984     Arthroscoopy Arthroscopic medial meniscectomy Left Knee     SURGICAL HISTORY OF -   3/29/2002    Right Knee,partial medial meniscectomy        Social History     Tobacco Use     Smoking status: Former Smoker     Years: 10.00     Last attempt to quit: 1975     Years since quittin.1     Smokeless tobacco: Former User     Quit date: 1980   Substance Use Topics     Alcohol use: Yes     Comment: occ has wine with dinner     Family History   Problem Relation Age of Onset     Hypertension Mother      Cerebrovascular Disease Mother      Hypertension Father      Cerebrovascular Disease Father 70     Diabetes Father      Eye Disorder Father         glacoma     Coronary Artery Disease Father      C.A.D. Brother         bypass surgery at age 55     Cardiovascular Brother         pulmonary hypertention     Respiratory Brother         sleep apnea     Heart Disease Brother         triple bypass     Diabetes Brother          Current Outpatient Medications   Medication Sig Dispense Refill     ASPIRIN 81 MG OR TABS 1 tab po QD (Once per day) 100 3     ciclopirox (LOPROX) 0.77 % cream Apply topically 2 times daily Apply to sites on face at bedtime 90 g 3     clobetasol (TEMOVATE) 0.05 % external solution Apply topically daily Apply to site son scalp daily 100 mL 3     ketoconazole (NIZORAL) 2 % external shampoo Apply to the affected area and wash off after 5 minutes. 240 mL 11     ALEVE 220 MG OR TABS 1 TABLET EVERY 12 HOURS AS NEEDED       EPINEPHrine (ADRENACLICK) 0.3 MG/0.3ML injection Inject 0.3 mLs (0.3 mg) into the muscle as needed for anaphylaxis 0.6 mL 1     EPIPEN 2-BON 0.3 MG/0.3ML injection Inject 0.3 mLs (0.3 mg) into the muscle once as needed for anaphylaxis (Patient taking differently: Inject 0.3 mg into the muscle once  "as needed for anaphylaxis For bee stings) 1 each 6     Allergies   Allergen Reactions     Bees      Bee Stings           ROS:  CONSTITUTIONAL: NEGATIVE for fever, chills, change in weight  ENT/MOUTH: NEGATIVE for ear, mouth and throat problems  RESP: NEGATIVE for significant cough or SOB  CV: NEGATIVE for chest pain, palpitations or peripheral edema    OBJECTIVE:   There were no vitals taken for this visit. Estimated body mass index is 28.35 kg/m  as calculated from the following:    Height as of 3/30/17: 1.702 m (5' 7\").    Weight as of 3/30/17: 82.1 kg (181 lb).  EXAM:   GENERAL: healthy, alert and no distress  NECK: no adenopathy, no asymmetry, masses, or scars and thyroid normal to palpation  RESP: lungs clear to auscultation - no rales, rhonchi or wheezes  CV: regular rate and rhythm, normal S1 S2, no S3 or S4, no murmur, click or rub, no peripheral edema and peripheral pulses strong  ABDOMEN: soft, nontender, no hepatosplenomegaly, no masses and bowel sounds normal  MS: no gross musculoskeletal defects noted, no edema        ASSESSMENT / PLAN:   1. Lipid screening    - Lipid panel reflex to direct LDL Fasting  - Comprehensive metabolic panel    2. Screening for prostate cancer    - PSA, total and free    3. Dermatitis, seborrheic    - clobetasol (TEMOVATE) 0.05 % external solution; Apply topically daily Apply to site son scalp daily  Dispense: 100 mL; Refill: 3  - ketoconazole (NIZORAL) 2 % external shampoo; Apply to the affected area and wash off after 5 minutes.  Dispense: 240 mL; Refill: 11  - ciclopirox (LOPROX) 0.77 % cream; Apply topically 2 times daily Apply to sites on face at bedtime  Dispense: 90 g; Refill: 3    4. Bee allergy status    - clobetasol (TEMOVATE) 0.05 % external solution; Apply topically daily Apply to site son scalp daily  Dispense: 100 mL; Refill: 3  - ketoconazole (NIZORAL) 2 % external shampoo; Apply to the affected area and wash off after 5 minutes.  Dispense: 240 mL; Refill: 11  - " "ciclopirox (LOPROX) 0.77 % cream; Apply topically 2 times daily Apply to sites on face at bedtime  Dispense: 90 g; Refill: 3    5. Bee sting allergy    - clobetasol (TEMOVATE) 0.05 % external solution; Apply topically daily Apply to site son scalp daily  Dispense: 100 mL; Refill: 3  - ketoconazole (NIZORAL) 2 % external shampoo; Apply to the affected area and wash off after 5 minutes.  Dispense: 240 mL; Refill: 11  - ciclopirox (LOPROX) 0.77 % cream; Apply topically 2 times daily Apply to sites on face at bedtime  Dispense: 90 g; Refill: 3    End of Life Planning:  Patient currently has an advanced directive:     COUNSELING:      BP Readings from Last 1 Encounters:   03/30/17 110/80     Estimated body mass index is 28.35 kg/m  as calculated from the following:    Height as of 3/30/17: 1.702 m (5' 7\").    Weight as of 3/30/17: 82.1 kg (181 lb).           reports that he quit smoking about 44 years ago. He quit after 10.00 years of use. He quit smokeless tobacco use about 39 years ago.      Appropriate preventive services were discussed with this patient, including applicable screening as appropriate for cardiovascular disease, diabetes, osteopenia/osteoporosis, and glaucoma.  As appropriate for age/gender, discussed screening for colorectal cancer, prostate cancer, breast cancer, and cervical cancer. Checklist reviewing preventive services available has been given to the patient.    Reviewed patients plan of care and provided an AVS. The  heidi Bourne meets the Care Plan requirement. This Care Plan has been established and reviewed with the     Counseling Resources:  ATP IV Guidelines  Pooled Cohorts Equation Calculator  Breast Cancer Risk Calculator  FRAX Risk Assessment  ICSI Preventive Guidelines  Dietary Guidelines for Americans, 2010  USDA's MyPlate  ASA Prophylaxis  Lung CA Screening    Renny Garcia MD  [facility]  "

## 2019-02-12 NOTE — NURSING NOTE
"Chief Complaint   Patient presents with     Medicare Visit       Initial /88   Pulse 68   Temp 97.4  F (36.3  C) (Tympanic)   Resp 18   Ht 1.715 m (5' 7.5\")   Wt 86.6 kg (191 lb)   BMI 29.47 kg/m   Estimated body mass index is 29.47 kg/m  as calculated from the following:    Height as of this encounter: 1.715 m (5' 7.5\").    Weight as of this encounter: 86.6 kg (191 lb).    Patient presents to the clinic using     Health Maintenance that is potentially due pending provider review:          Is there anyone who you would like to be able to receive your results?   If yes have patient fill out BHAVANI    "

## 2019-02-12 NOTE — PATIENT INSTRUCTIONS
Preventive Health Recommendations:     See your health care provider every year to    Review health changes.     Discuss preventive care.      Review your medicines if your doctor has prescribed any.    Talk with your health care provider about whether you should have a test to screen for prostate cancer (PSA).    Every 3 years, have a diabetes test (fasting glucose). If you are at risk for diabetes, you should have this test more often.    Every 5 years, have a cholesterol test. Have this test more often if you are at risk for high cholesterol or heart disease.     Every 10 years, have a colonoscopy. Or, have a yearly FIT test (stool test). These exams will check for colon cancer.    Talk to with your health care provider about screening for Abdominal Aortic Aneurysm if you have a family history of AAA or have a history of smoking.  Shots:     Get a flu shot each year.     Get a tetanus shot every 10 years.     Talk to your doctor about your pneumonia vaccines. There are now two you should receive - Pneumovax (PPSV 23) and Prevnar (PCV 13).    Talk to your pharmacist about a shingles vaccine.     Talk to your doctor about the hepatitis B vaccine.  Nutrition:     Eat at least 5 servings of fruits and vegetables each day.     Eat whole-grain bread, whole-wheat pasta and brown rice instead of white grains and rice.     Get adequate Calcium and Vitamin D.   Lifestyle    Exercise for at least 150 minutes a week (30 minutes a day, 5 days a week). This will help you control your weight and prevent disease.     Limit alcohol to one drink per day.     No smoking.     Wear sunscreen to prevent skin cancer.     See your dentist every six months for an exam and cleaning.     See your eye doctor every 1 to 2 years to screen for conditions such as glaucoma, macular degeneration and cataracts.    Personalized Prevention Plan  You are due for the preventive services outlined below.  Your care team is available to assist you in  scheduling these services.  If you have already completed any of these items, please share that information with your care team to update in your medical record.    Health Maintenance Due   Topic Date Due     Zoster (Shingles) Vaccine (1 of 2) 06/01/2000     Diptheria Tetanus Pertussis (DTAP/TDAP/TD) Vaccine (2 - Td) 01/01/2009     Discuss Advance Directive Planning  07/11/2017     FALL RISK ASSESSMENT  03/30/2018     Depression Assessment 2 - yearly  03/30/2018     Preventive Health Recommendations:     See your health care provider every year to    Review health changes.     Discuss preventive care.      Review your medicines if your doctor has prescribed any.    Talk with your health care provider about whether you should have a test to screen for prostate cancer (PSA).    Every 3 years, have a diabetes test (fasting glucose). If you are at risk for diabetes, you should have this test more often.    Every 5 years, have a cholesterol test. Have this test more often if you are at risk for high cholesterol or heart disease.     Every 10 years, have a colonoscopy. Or, have a yearly FIT test (stool test). These exams will check for colon cancer.    Talk to with your health care provider about screening for Abdominal Aortic Aneurysm if you have a family history of AAA or have a history of smoking.  Shots:     Get a flu shot each year.     Get a tetanus shot every 10 years.     Talk to your doctor about your pneumonia vaccines. There are now two you should receive - Pneumovax (PPSV 23) and Prevnar (PCV 13).    Talk to your pharmacist about a shingles vaccine.     Talk to your doctor about the hepatitis B vaccine.  Nutrition:     Eat at least 5 servings of fruits and vegetables each day.     Eat whole-grain bread, whole-wheat pasta and brown rice instead of white grains and rice.     Get adequate Calcium and Vitamin D.   Lifestyle    Exercise for at least 150 minutes a week (30 minutes a day, 5 days a week). This will  help you control your weight and prevent disease.     Limit alcohol to one drink per day.     No smoking.     Wear sunscreen to prevent skin cancer.     See your dentist every six months for an exam and cleaning.     See your eye doctor every 1 to 2 years to screen for conditions such as glaucoma, macular degeneration and cataracts.    Personalized Prevention Plan  You are due for the preventive services outlined below.  Your care team is available to assist you in scheduling these services.  If you have already completed any of these items, please share that information with your care team to update in your medical record.    Health Maintenance Due   Topic Date Due     Zoster (Shingles) Vaccine (1 of 2) 06/01/2000     Diptheria Tetanus Pertussis (DTAP/TDAP/TD) Vaccine (2 - Td) 01/01/2009     Discuss Advance Directive Planning  07/11/2017     FALL RISK ASSESSMENT  03/30/2018     Depression Assessment 2 - yearly  03/30/2018   Immunization Clinic:  [phone]

## 2019-02-13 LAB
PSA FREE MFR SERPL: 25 %
PSA FREE SERPL-MCNC: 0.3 NG/ML
PSA SERPL-MCNC: 1.2 NG/ML (ref 0–4)

## 2019-03-09 ENCOUNTER — TELEPHONE (OUTPATIENT)
Dept: OTHER | Facility: CLINIC | Age: 69
End: 2019-03-09

## 2019-03-20 ENCOUNTER — OFFICE VISIT (OUTPATIENT)
Dept: OTOLARYNGOLOGY | Facility: CLINIC | Age: 69
End: 2019-03-20
Payer: COMMERCIAL

## 2019-03-20 VITALS
WEIGHT: 191 LBS | TEMPERATURE: 98.4 F | HEART RATE: 72 BPM | DIASTOLIC BLOOD PRESSURE: 87 MMHG | SYSTOLIC BLOOD PRESSURE: 138 MMHG | BODY MASS INDEX: 29.47 KG/M2

## 2019-03-20 DIAGNOSIS — J32.0 CHRONIC MAXILLARY SINUSITIS: Primary | ICD-10-CM

## 2019-03-20 DIAGNOSIS — J34.2 DEVIATED NASAL SEPTUM: ICD-10-CM

## 2019-03-20 PROCEDURE — 99204 OFFICE O/P NEW MOD 45 MIN: CPT | Performed by: OTOLARYNGOLOGY

## 2019-03-20 ASSESSMENT — PAIN SCALES - GENERAL: PAINLEVEL: NO PAIN (0)

## 2019-03-20 NOTE — PROGRESS NOTES
History of Present Illness - Steffen Mitchell is a 68 year old male who presents with concern for sinus problems. He was at his dentist they tried placing 2 implants, but they fell out. This occurred bilaterally. The dentist told him he had a sinus infection based on some imaging at the time. He denies thick rhinorrhea. He has drippy nose and sneezing, which improved since starting Flonase. He did have some mild epistaxis while in [place] so he stopped the Flonase for now.     Past Medical History -   Patient Active Problem List   Diagnosis     FAMILY HISTORY OF CARDIOVASC DIS (ISCHEMIC)     ALLERGY   **  SEE ALSO ALLERGIC REAC BEE STING     FAMILY HISTORY OF DIABETES MELLITUS     Sensorineural hearing loss     Subjective tinnitus     CARDIOVASCULAR SCREENING; LDL GOAL LESS THAN 160     Advanced directives, counseling/discussion     Family history of diabetes mellitus (DM)       Current Medications -   Current Outpatient Medications:      ASPIRIN 81 MG OR TABS, 1 tab po QD (Once per day), Disp: 100, Rfl: 3     ciclopirox (LOPROX) 0.77 % cream, Apply topically 2 times daily Apply to sites on face at bedtime, Disp: 90 g, Rfl: 3     clobetasol (TEMOVATE) 0.05 % external solution, Apply topically daily Apply to site son scalp daily, Disp: 100 mL, Rfl: 3     ketoconazole (NIZORAL) 2 % external shampoo, Apply to the affected area and wash off after 5 minutes., Disp: 240 mL, Rfl: 11     ALEVE 220 MG OR TABS, 1 TABLET EVERY 12 HOURS AS NEEDED, Disp: , Rfl:      EPINEPHrine (ADRENACLICK) 0.3 MG/0.3ML injection, Inject 0.3 mLs (0.3 mg) into the muscle as needed for anaphylaxis, Disp: 0.6 mL, Rfl: 1    Allergies -   Allergies   Allergen Reactions     Bees      Bee Stings         Social History -   Social History     Socioeconomic History     Marital status:      Spouse name: None     Number of children: None     Years of education: None     Highest education level: None   Occupational History     None   Social Needs      Financial resource strain: None     Food insecurity:     Worry: None     Inability: None     Transportation needs:     Medical: None     Non-medical: None   Tobacco Use     Smoking status: Former Smoker     Years: 10.00     Last attempt to quit: 1975     Years since quittin.2     Smokeless tobacco: Former User     Quit date: 1980   Substance and Sexual Activity     Alcohol use: Yes     Comment: occ has wine with dinner     Drug use: No     Sexual activity: Yes     Partners: Female   Lifestyle     Physical activity:     Days per week: None     Minutes per session: None     Stress: None   Relationships     Social connections:     Talks on phone: None     Gets together: None     Attends Restoration service: None     Active member of club or organization: None     Attends meetings of clubs or organizations: None     Relationship status: None     Intimate partner violence:     Fear of current or ex partner: None     Emotionally abused: None     Physically abused: None     Forced sexual activity: None   Other Topics Concern      Service No     Blood Transfusions No     Caffeine Concern Yes     Comment: 4-5 cups     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 No     Comment: father had strokes   Social History Narrative     None       Family History -   Family History   Problem Relation Age of Onset     Hypertension Mother      Cerebrovascular Disease Mother      Hypertension Father      Cerebrovascular Disease Father 70     Diabetes Father      Eye Disorder Father         glacoma     Coronary Artery Disease Father      C.A.D. Brother          bypass surgery at age 55     Cardiovascular Brother         pulmonary hypertention     Respiratory Brother         sleep apnea     Heart Disease Brother         triple bypass       Review of Systems - As per HPI and PMHx, otherwise 7 system review of the head and neck negative. 10+ system review negative.    Physical Exam  /87 (BP Location: Right arm, Patient Position: Chair, Cuff Size: Adult Regular)   Pulse 72   Temp 98.4  F (36.9  C) (Oral)   Wt 86.6 kg (191 lb)   BMI 29.47 kg/m    General - The patient is well nourished and well developed, and appears to have good nutritional status.  Alert and oriented to person and place, answers questions and cooperates with examination appropriately.   Head and Face - Normocephalic and atraumatic, with no gross asymmetry noted of the contour of the facial features.  The facial nerve is intact, with strong symmetric movements.  Voice and Breathing - The patient was breathing comfortably without the use of accessory muscles. There was no wheezing, stridor, or stertor.  The patients voice was clear and strong, and had appropriate pitch and quality.  Ears - Bilateral pinna and EACs with normal appearing overlying skin. Tympanic membrane intact with good mobility on pneumatic otoscopy bilaterally. Bony landmarks of the ossicular chain are normal. The tympanic membranes are normal in appearance. No retraction, perforation, or masses.  No fluid or purulence was seen in the external canal or the middle ear.   Eyes - Extraocular movements intact.  Sclera were not icteric or injected, conjunctiva were pink and moist.  Mouth - Examination of the oral cavity showed pink, healthy oral mucosa. No lesions or ulcerations noted.  The tongue was mobile and midline, and the dentition were in good condition.    Throat - The walls of the oropharynx were smooth, pink, moist, symmetric, and had no lesions or ulcerations.  The tonsillar pillars and soft palate were symmetric.  The uvula  was midline on elevation.  Neck - Normal midline excursion of the laryngotracheal complex during swallowing.  Full range of motion on passive movement.  Palpation of the occipital, submental, submandibular, internal jugular chain, and supraclavicular nodes did not demonstrate any abnormal lymph nodes or masses.  The carotid pulse was palpable bilaterally.  Palpation of the thyroid was soft and smooth, with no nodules or goiter appreciated.  The trachea was mobile and midline.  Nose - External contour is symmetric, no gross deflection or scars.  Nasal mucosa is pink and moist with no abnormal mucus.  The septum was severely deviated to the right with nasal airway obstruction.        Assessment - Steffen Mitchell is a 68 year old male with possible sinusitis, but with limited related symptoms. I requested a CT Sinus, unless he can produce the CT sinus from his dentist for my review. I discussed septoplasty, but he is not bothered by his nasal breathing. Hold the Flonase for now due to the epistaxis.       Dr. Brendon Padilla MD  Otolaryngology  [facility]

## 2019-03-20 NOTE — NURSING NOTE
"Initial /87 (BP Location: Right arm, Patient Position: Chair, Cuff Size: Adult Regular)   Pulse 72   Temp 98.4  F (36.9  C) (Oral)   Wt 86.6 kg (191 lb)   BMI 29.47 kg/m   Estimated body mass index is 29.47 kg/m  as calculated from the following:    Height as of 2/12/19: 1.715 m (5' 7.5\").    Weight as of this encounter: 86.6 kg (191 lb). .    Hayley Batres LPN    "

## 2019-03-20 NOTE — LETTER
3/20/2019         RE: Steffen Mitchell  [address]  [place] [address]        Dear Colleague,    Thank you for referring your patient, Steffen Mitchell, to the [facility]. Please see a copy of my visit note below.        History of Present Illness - Steffen Mitchell is a 68 year old male who presents with concern for sinus problems. He was at his dentist they tried placing 2 implants, but they fell out. This occurred bilaterally. The dentist told him he had a sinus infection based on some imaging at the time. He denies thick rhinorrhea. He has drippy nose and sneezing, which improved since starting Flonase. He did have some mild epistaxis while in [place] so he stopped the Flonase for now.     Past Medical History -   Patient Active Problem List   Diagnosis     FAMILY HISTORY OF CARDIOVASC DIS (ISCHEMIC)     ALLERGY   **  SEE ALSO ALLERGIC REAC BEE STING     FAMILY HISTORY OF DIABETES MELLITUS     Sensorineural hearing loss     Subjective tinnitus     CARDIOVASCULAR SCREENING; LDL GOAL LESS THAN 160     Advanced directives, counseling/discussion     Family history of diabetes mellitus (DM)       Current Medications -   Current Outpatient Medications:      ASPIRIN 81 MG OR TABS, 1 tab po QD (Once per day), Disp: 100, Rfl: 3     ciclopirox (LOPROX) 0.77 % cream, Apply topically 2 times daily Apply to sites on face at bedtime, Disp: 90 g, Rfl: 3     clobetasol (TEMOVATE) 0.05 % external solution, Apply topically daily Apply to site son scalp daily, Disp: 100 mL, Rfl: 3     ketoconazole (NIZORAL) 2 % external shampoo, Apply to the affected area and wash off after 5 minutes., Disp: 240 mL, Rfl: 11     ALEVE 220 MG OR TABS, 1 TABLET EVERY 12 HOURS AS NEEDED, Disp: , Rfl:      EPINEPHrine (ADRENACLICK) 0.3 MG/0.3ML injection, Inject 0.3 mLs (0.3 mg) into the muscle as needed for anaphylaxis, Disp: 0.6 mL, Rfl: 1    Allergies -   Allergies   Allergen Reactions     Bees      Bee Stings         Social History -    Social History     Socioeconomic History     Marital status:      Spouse name: None     Number of children: None     Years of education: None     Highest education level: None   Occupational History     None   Social Needs     Financial resource strain: None     Food insecurity:     Worry: None     Inability: None     Transportation needs:     Medical: None     Non-medical: None   Tobacco Use     Smoking status: Former Smoker     Years: 10.00     Last attempt to quit: 1975     Years since quittin.2     Smokeless tobacco: Former User     Quit date: 1980   Substance and Sexual Activity     Alcohol use: Yes     Comment: occ has wine with dinner     Drug use: No     Sexual activity: Yes     Partners: Female   Lifestyle     Physical activity:     Days per week: None     Minutes per session: None     Stress: None   Relationships     Social connections:     Talks on phone: None     Gets together: None     Attends Judaism service: None     Active member of club or organization: None     Attends meetings of clubs or organizations: None     Relationship status: None     Intimate partner violence:     Fear of current or ex partner: None     Emotionally abused: None     Physically abused: None     Forced sexual activity: None   Other Topics Concern      Service No     Blood Transfusions No     Caffeine Concern Yes     Comment: 4-5 cups     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 No     Comment: father had strokes   Social History Narrative     None       Family History -   Family History   Problem Relation Age of Onset      Hypertension Mother      Cerebrovascular Disease Mother      Hypertension Father      Cerebrovascular Disease Father 70     Diabetes Father      Eye Disorder Father         glacoma     Coronary Artery Disease Father      C.A.D. Brother         bypass surgery at age 55     Cardiovascular Brother         pulmonary hypertention     Respiratory Brother         sleep apnea     Heart Disease Brother         triple bypass       Review of Systems - As per HPI and PMHx, otherwise 7 system review of the head and neck negative. 10+ system review negative.    Physical Exam  /87 (BP Location: Right arm, Patient Position: Chair, Cuff Size: Adult Regular)   Pulse 72   Temp 98.4  F (36.9  C) (Oral)   Wt 86.6 kg (191 lb)   BMI 29.47 kg/m     General - The patient is well nourished and well developed, and appears to have good nutritional status.  Alert and oriented to person and place, answers questions and cooperates with examination appropriately.   Head and Face - Normocephalic and atraumatic, with no gross asymmetry noted of the contour of the facial features.  The facial nerve is intact, with strong symmetric movements.  Voice and Breathing - The patient was breathing comfortably without the use of accessory muscles. There was no wheezing, stridor, or stertor.  The patients voice was clear and strong, and had appropriate pitch and quality.  Ears - Bilateral pinna and EACs with normal appearing overlying skin. Tympanic membrane intact with good mobility on pneumatic otoscopy bilaterally. Bony landmarks of the ossicular chain are normal. The tympanic membranes are normal in appearance. No retraction, perforation, or masses.  No fluid or purulence was seen in the external canal or the middle ear.   Eyes - Extraocular movements intact.  Sclera were not icteric or injected, conjunctiva were pink and moist.  Mouth - Examination of the oral cavity showed pink, healthy oral mucosa. No lesions or ulcerations noted.  The tongue  was mobile and midline, and the dentition were in good condition.    Throat - The walls of the oropharynx were smooth, pink, moist, symmetric, and had no lesions or ulcerations.  The tonsillar pillars and soft palate were symmetric.  The uvula was midline on elevation.  Neck - Normal midline excursion of the laryngotracheal complex during swallowing.  Full range of motion on passive movement.  Palpation of the occipital, submental, submandibular, internal jugular chain, and supraclavicular nodes did not demonstrate any abnormal lymph nodes or masses.  The carotid pulse was palpable bilaterally.  Palpation of the thyroid was soft and smooth, with no nodules or goiter appreciated.  The trachea was mobile and midline.  Nose - External contour is symmetric, no gross deflection or scars.  Nasal mucosa is pink and moist with no abnormal mucus.  The septum was severely deviated to the right with nasal airway obstruction.        Assessment - Steffen Mitchell is a 68 year old male with possible sinusitis, but with limited related symptoms. I requested a CT Sinus, unless he can produce the CT sinus from his dentist for my review. I discussed septoplasty, but he is not bothered by his nasal breathing. Hold the Flonase for now due to the epistaxis.       Dr. Brendon Padilla MD  Otolaryngology  [facility]        Again, thank you for allowing me to participate in the care of your patient.        Sincerely,        Brendon Padilla MD

## 2019-10-15 ENCOUNTER — ALLIED HEALTH/NURSE VISIT (OUTPATIENT)
Dept: FAMILY MEDICINE | Facility: CLINIC | Age: 69
End: 2019-10-15
Payer: COMMERCIAL

## 2019-10-15 DIAGNOSIS — Z23 NEEDS FLU SHOT: Primary | ICD-10-CM

## 2019-10-15 PROCEDURE — G0008 ADMIN INFLUENZA VIRUS VAC: HCPCS

## 2019-10-15 PROCEDURE — 99207 ZZC NO CHARGE NURSE ONLY: CPT

## 2019-10-15 PROCEDURE — 90662 IIV NO PRSV INCREASED AG IM: CPT

## 2019-11-02 ENCOUNTER — HEALTH MAINTENANCE LETTER (OUTPATIENT)
Age: 69
End: 2019-11-02

## 2020-02-10 ENCOUNTER — HEALTH MAINTENANCE LETTER (OUTPATIENT)
Age: 70
End: 2020-02-10

## 2020-03-02 ENCOUNTER — OFFICE VISIT (OUTPATIENT)
Dept: FAMILY MEDICINE | Facility: CLINIC | Age: 70
End: 2020-03-02
Payer: COMMERCIAL

## 2020-03-02 VITALS
TEMPERATURE: 98 F | HEART RATE: 73 BPM | DIASTOLIC BLOOD PRESSURE: 84 MMHG | OXYGEN SATURATION: 98 % | BODY MASS INDEX: 28.41 KG/M2 | HEIGHT: 67 IN | RESPIRATION RATE: 16 BRPM | SYSTOLIC BLOOD PRESSURE: 130 MMHG | WEIGHT: 181 LBS

## 2020-03-02 DIAGNOSIS — L30.9 DERMATITIS: ICD-10-CM

## 2020-03-02 DIAGNOSIS — Z12.5 SCREENING FOR PROSTATE CANCER: ICD-10-CM

## 2020-03-02 DIAGNOSIS — Z00.00 MEDICARE ANNUAL WELLNESS VISIT, SUBSEQUENT: Primary | ICD-10-CM

## 2020-03-02 PROCEDURE — 99397 PER PM REEVAL EST PAT 65+ YR: CPT | Performed by: FAMILY MEDICINE

## 2020-03-02 RX ORDER — DESONIDE 0.5 MG/G
CREAM TOPICAL
Qty: 60 G | Refills: 1 | Status: SHIPPED | OUTPATIENT
Start: 2020-03-02 | End: 2023-10-26

## 2020-03-02 ASSESSMENT — ENCOUNTER SYMPTOMS
COUGH: 0
DYSURIA: 0
DIZZINESS: 0
PARESTHESIAS: 0
ARTHRALGIAS: 1
PALPITATIONS: 0
WEAKNESS: 0
JOINT SWELLING: 0
FREQUENCY: 0
EYE PAIN: 0
MYALGIAS: 0
SORE THROAT: 0
HEADACHES: 0
CHILLS: 0
CONSTIPATION: 0
NAUSEA: 0
HEMATOCHEZIA: 0
HEARTBURN: 0
SHORTNESS OF BREATH: 0
DIARRHEA: 0
FEVER: 0
NERVOUS/ANXIOUS: 0
ABDOMINAL PAIN: 0
HEMATURIA: 0

## 2020-03-02 ASSESSMENT — ACTIVITIES OF DAILY LIVING (ADL): CURRENT_FUNCTION: NO ASSISTANCE NEEDED

## 2020-03-02 ASSESSMENT — MIFFLIN-ST. JEOR: SCORE: 1544.64

## 2020-03-02 NOTE — NURSING NOTE
"Chief Complaint   Patient presents with     Physical       Initial /84   Pulse 73   Temp 98  F (36.7  C) (Tympanic)   Resp 16   Ht 1.702 m (5' 7\")   Wt 82.1 kg (181 lb)   SpO2 98%   BMI 28.35 kg/m   Estimated body mass index is 28.35 kg/m  as calculated from the following:    Height as of this encounter: 1.702 m (5' 7\").    Weight as of this encounter: 82.1 kg (181 lb).    Patient presents to the clinic using No DME    Health Maintenance that is potentially due pending provider review:  Tdap today?        Is there anyone who you would like to be able to receive your results? No  If yes have patient fill out BHAVANI    Christine Bauman CMA(AAMA)    "

## 2020-03-02 NOTE — PROGRESS NOTES
"SUBJECTIVE:   Steffen Mitchell is a 69 year old male who presents for Preventive Visit.    Are you in the first 12 months of your Medicare coverage?  No    Healthy Habits:     In general, how would you rate your overall health?  Excellent    Frequency of exercise:  2-3 days/week    Duration of exercise:  30-45 minutes    Do you usually eat at least 4 servings of fruit and vegetables a day, include whole grains    & fiber and avoid regularly eating high fat or \"junk\" foods?  Yes    Taking medications regularly:  Yes    Medication side effects:  Not applicable    Ability to successfully perform activities of daily living:  No assistance needed    Home Safety:  Lack of grab bars in the bathroom    Hearing Impairment:  Difficulty following a conversation in a noisy restaurant or crowded room, difficulty following dialogue in the theater, find that men's voices are easier to understand than woman's, difficulty understanding soft or whispered speech and difficulty understanding speech on the telephone    In the past 6 months, have you been bothered by leaking of urine?  No    In general, how would you rate your overall mental or emotional health?  Excellent      PHQ-2 Total Score: 0    Additional concerns today:  No  Refill desonex?    Do you feel safe in your environment? Yes    Have you ever done Advance Care Planning? (For example, a Health Directive, POLST, or a discussion with a medical provider or your loved ones about your wishes): Yes, patient states has an Advance Care Planning document and will bring a copy to the clinic.      Fall risk  Fallen 2 or more times in the past year?: No  Any fall with injury in the past year?: No    Cognitive Screening   1) Repeat 3 items (Leader, Season, Table)    2) Clock draw: NORMAL  3) 3 item recall: Recalls 1 object   Results: NORMAL clock, 1-2 items recalled: COGNITIVE IMPAIRMENT LESS LIKELY    Mini-CogTM Copyright S Olivier. Licensed by the author for use in [facility] " Services; reprinted with permission (soob@[email or web address]). All rights reserved.        Reviewed and updated as needed this visit by clinical staff         Reviewed and updated as needed this visit by Provider        Social History     Tobacco Use     Smoking status: Former Smoker     Years: 10.00     Last attempt to quit: 1975     Years since quittin.1     Smokeless tobacco: Former User     Quit date: 1980   Substance Use Topics     Alcohol use: Yes     Comment: occ has wine with dinner         Alcohol Use 3/2/2020   Prescreen: >3 drinks/day or >7 drinks/week? No   Prescreen: >3 drinks/day or >7 drinks/week? -         Current providers sharing in care for this patient include:   Patient Care Team:  Renny Rosado MD as PCP - General (Family Practice)  Renny Garcia MD as Assigned PCP    The following health maintenance items are reviewed in Epic and correct as of today:  Health Maintenance   Topic Date Due     ZOSTER IMMUNIZATION (1 of 2) 2000     DTAP/TDAP/TD IMMUNIZATION (2 - Td) 2009     ADVANCE CARE PLANNING  2017     MEDICARE ANNUAL WELLNESS VISIT  2018     PHQ-2  2020     FALL RISK ASSESSMENT  2020     COLONOSCOPY  2020     LIPID  [id number]     HEPATITIS C SCREENING  Completed     INFLUENZA VACCINE  Completed     PNEUMOCOCCAL IMMUNIZATION 65+ LOW/MEDIUM RISK  Completed     AORTIC ANEURYSM SCREENING (SYSTEM ASSIGNED)  Completed     IPV IMMUNIZATION  Aged Out     MENINGITIS IMMUNIZATION  Aged Out           Review of Systems   Constitutional: Negative for chills and fever.   HENT: Positive for congestion and hearing loss. Negative for ear pain and sore throat.    Eyes: Negative for pain and visual disturbance.   Respiratory: Negative for cough and shortness of breath.    Cardiovascular: Negative for chest pain, palpitations and peripheral edema.   Gastrointestinal: Negative for abdominal pain, constipation, diarrhea, heartburn, hematochezia and  "nausea.   Genitourinary: Negative for discharge, dysuria, frequency, genital sores, hematuria, impotence and urgency.   Musculoskeletal: Positive for arthralgias. Negative for joint swelling and myalgias.   Skin: Negative for rash.   Neurological: Negative for dizziness, weakness, headaches and paresthesias.   Psychiatric/Behavioral: Negative for mood changes. The patient is not nervous/anxious.          OBJECTIVE:   /84   Pulse 73   Temp 98  F (36.7  C) (Tympanic)   Resp 16   Ht 1.702 m (5' 7\")   Wt 82.1 kg (181 lb)   SpO2 98%   BMI 28.35 kg/m   Estimated body mass index is 29.47 kg/m  as calculated from the following:    Height as of 2/12/19: 1.715 m (5' 7.5\").    Weight as of 3/20/19: 86.6 kg (191 lb).  Physical Exam  Gen: AOx3, no acute distress  PSYCH: Affect WNL, logical thought and coherent speech   EYES: normal lids, conjunctiva.  Pupils normal. No periorbital swelling.  Neck:supple without lymphadenopathy or mass.    Throat: oroparynx clear, no exudate or tonsilar/palate asymmetry  Ears: normal TMs bilaterally.   CV: RRR, no murmur  Lungs: Clear bilaterally with good effort  Abd: nontender with no mass or organomegaly  Ext: no edema, moving all extremities  Neuro: gait normal, cranial nerves 2-12 grossly intact, symmetric strength, no sensory deficits noted  Skin: no rash or suspicious lesions noted          ASSESSMENT / PLAN:   Well exam    Doing well.  No labs needed this time.   Back in a year    Discussed risks and benefits of PSA screening with the patient, attempting to engage in shared decision making.  Discussed the high likelihood of further invasive evaluation if the PSA is elevated.  Discussed inability to reliably distinguish aggressive from indolent cancer on biopsy. Explained that PSA screeing and aggressive treatment for biopsy confirmed cancer may extend life in those patients harboring an aggressive form of prostate cancer, but will overtreat the overwhelming majority of men " "because of the high rate of indolent cancer.  We discussed that treatment of prostate cancer is not benign, and that significant side effects are a real possibility.  We discussed current USPSTF recommendations against any routine screening for prostate cancer.                 Given the above information, the patient elected not to procede with PSA screeing at this time.  He understands he can revisit the discussion at any time.        COUNSELING:  Reviewed preventive health counseling, as reflected in patient instructions       Regular exercise       Healthy diet/nutrition    Estimated body mass index is 29.47 kg/m  as calculated from the following:    Height as of 2/12/19: 1.715 m (5' 7.5\").    Weight as of 3/20/19: 86.6 kg (191 lb).    Weight management plan: diet/exercise     reports that he quit smoking about 45 years ago. He quit after 10.00 years of use. He quit smokeless tobacco use about 40 years ago.      Appropriate preventive services were discussed with this patient, including applicable screening as appropriate for cardiovascular disease, diabetes, osteopenia/osteoporosis, and glaucoma.  As appropriate for age/gender, discussed screening for colorectal cancer, prostate cancer, breast cancer, and cervical cancer. Checklist reviewing preventive services available has been given to the patient.    Reviewed patients plan of care and provided an AVS. The Basic Care Plan (routine screening as documented in Health Maintenance) for Steffen meets the Care Plan requirement. This Care Plan has been established and reviewed with the Patient.        Gary Baltazar MD  [facility]    Identified Health Risks:  "

## 2020-03-02 NOTE — PATIENT INSTRUCTIONS
Preventive Health Recommendations:     See your health care provider every year to    Review health changes.     Discuss preventive care.      Review your medicines if your doctor has prescribed any.      Talk with your health care provider about whether you should have a test to screen for prostate cancer (PSA).    Every 3 years, have a diabetes test (fasting glucose). If you are at risk for diabetes, you should have this test more often.    Every 5 years, have a cholesterol test. Have this test more often if you are at risk for high cholesterol or heart disease.     Every 10 years, have a colonoscopy. Or, have a yearly FIT test (stool test). These exams will check for colon cancer.    Talk to with your health care provider about screening for Abdominal Aortic Aneurysm if you have a family history of AAA or have a history of smoking.    Shots:     Get a flu shot each year.     Get a tetanus shot every 10 years.     Talk to your doctor about your pneumonia vaccines. There are now two you should receive - Pneumovax (PPSV 23) and Prevnar (PCV 13).     Talk to your pharmacist about a shingles vaccine.     Talk to your doctor about the hepatitis B vaccine.  Nutrition:     Eat at least 5 servings of fruits and vegetables each day.     Eat whole-grain bread, whole-wheat pasta and brown rice instead of white grains and rice.     Get adequate Calcium and Vitamin D.   Lifestyle    Exercise for at least 150 minutes a week (30 minutes a day, 5 days a week). This will help you control your weight and prevent disease.     Limit alcohol to one drink per day.     No smoking.     Wear sunscreen to prevent skin cancer.    See your dentist every six months for an exam and cleaning.    See your eye doctor every 1 to 2 years to screen for conditions such as glaucoma, macular degeneration, cataracts, etc.    Personalized Prevention Plan  You are due for the preventive services outlined below.  Your care team is available to assist you  in scheduling these services.  If you have already completed any of these items, please share that information with your care team to update in your medical record.  Health Maintenance Due   Topic Date Due     Zoster (Shingles) Vaccine (1 of 2) 06/01/2000     Diptheria Tetanus Pertussis (DTAP/TDAP/TD) Vaccine (2 - Td) 01/01/2009     Discuss Advance Care Planning  07/11/2017     Annual Wellness Visit  03/30/2018     PHQ-2  01/01/2020     FALL RISK ASSESSMENT  02/12/2020

## 2020-10-01 ENCOUNTER — IMMUNIZATION (OUTPATIENT)
Dept: FAMILY MEDICINE | Facility: CLINIC | Age: 70
End: 2020-10-01
Payer: COMMERCIAL

## 2020-10-01 DIAGNOSIS — Z23 NEED FOR PROPHYLACTIC VACCINATION AND INOCULATION AGAINST INFLUENZA: Primary | ICD-10-CM

## 2020-10-01 PROCEDURE — G0008 ADMIN INFLUENZA VIRUS VAC: HCPCS

## 2020-10-01 PROCEDURE — 90662 IIV NO PRSV INCREASED AG IM: CPT

## 2021-04-03 ENCOUNTER — HEALTH MAINTENANCE LETTER (OUTPATIENT)
Age: 71
End: 2021-04-03

## 2021-09-05 ENCOUNTER — NURSE TRIAGE (OUTPATIENT)
Dept: NURSING | Facility: CLINIC | Age: 71
End: 2021-09-05

## 2021-09-05 ENCOUNTER — HOSPITAL ENCOUNTER (EMERGENCY)
Facility: CLINIC | Age: 71
Discharge: HOME | End: 2021-09-05
Attending: EMERGENCY MEDICINE | Admitting: EMERGENCY MEDICINE
Payer: COMMERCIAL

## 2021-09-05 VITALS
WEIGHT: 185 LBS | TEMPERATURE: 97.3 F | OXYGEN SATURATION: 98 % | DIASTOLIC BLOOD PRESSURE: 78 MMHG | RESPIRATION RATE: 16 BRPM | SYSTOLIC BLOOD PRESSURE: 126 MMHG | BODY MASS INDEX: 28.98 KG/M2 | HEART RATE: 69 BPM

## 2021-09-05 DIAGNOSIS — R50.9 FEVER, UNSPECIFIED: ICD-10-CM

## 2021-09-05 LAB
ALBUMIN SERPL-MCNC: 2.8 G/DL (ref 3.4–5)
ALP SERPL-CCNC: 81 U/L (ref 40–150)
ALT SERPL W P-5'-P-CCNC: 32 U/L (ref 0–70)
ANION GAP SERPL CALCULATED.3IONS-SCNC: 6 MMOL/L (ref 3–14)
AST SERPL W P-5'-P-CCNC: 17 U/L (ref 0–45)
BASOPHILS # BLD AUTO: 0 10E3/UL (ref 0–0.2)
BASOPHILS NFR BLD AUTO: 0 %
BILIRUB SERPL-MCNC: 0.2 MG/DL (ref 0.2–1.3)
BUN SERPL-MCNC: 24 MG/DL (ref 7–30)
CALCIUM SERPL-MCNC: 7.9 MG/DL (ref 8.5–10.1)
CHLORIDE BLD-SCNC: 106 MMOL/L (ref 94–109)
CO2 SERPL-SCNC: 26 MMOL/L (ref 20–32)
CREAT SERPL-MCNC: 1.25 MG/DL (ref 0.66–1.25)
EOSINOPHIL # BLD AUTO: 0.1 10E3/UL (ref 0–0.7)
EOSINOPHIL NFR BLD AUTO: 2 %
ERYTHROCYTE [DISTWIDTH] IN BLOOD BY AUTOMATED COUNT: 15.9 % (ref 10–15)
GFR SERPL CREATININE-BSD FRML MDRD: 58 ML/MIN/1.73M2
GLUCOSE BLD-MCNC: 114 MG/DL (ref 70–99)
HCT VFR BLD AUTO: 32 % (ref 40–53)
HGB BLD-MCNC: 10.3 G/DL (ref 13.3–17.7)
IMM GRANULOCYTES # BLD: 0 10E3/UL
IMM GRANULOCYTES NFR BLD: 0 %
LACTATE SERPL-SCNC: 0.9 MMOL/L (ref 0.7–2)
LYMPHOCYTES # BLD AUTO: 0.7 10E3/UL (ref 0.8–5.3)
LYMPHOCYTES NFR BLD AUTO: 10 %
MCH RBC QN AUTO: 25.1 PG (ref 26.5–33)
MCHC RBC AUTO-ENTMCNC: 32.2 G/DL (ref 31.5–36.5)
MCV RBC AUTO: 78 FL (ref 78–100)
MONOCYTES # BLD AUTO: 0.6 10E3/UL (ref 0–1.3)
MONOCYTES NFR BLD AUTO: 7 %
NEUTROPHILS # BLD AUTO: 6.2 10E3/UL (ref 1.6–8.3)
NEUTROPHILS NFR BLD AUTO: 81 %
NRBC # BLD AUTO: 0 10E3/UL
NRBC BLD AUTO-RTO: 0 /100
PLATELET # BLD AUTO: 288 10E3/UL (ref 150–450)
POTASSIUM BLD-SCNC: 4.3 MMOL/L (ref 3.4–5.3)
PROT SERPL-MCNC: 7.3 G/DL (ref 6.8–8.8)
RBC # BLD AUTO: 4.1 10E6/UL (ref 4.4–5.9)
SARS-COV-2 RNA RESP QL NAA+PROBE: NEGATIVE
SODIUM SERPL-SCNC: 138 MMOL/L (ref 133–144)
WBC # BLD AUTO: 7.7 10E3/UL (ref 4–11)

## 2021-09-05 PROCEDURE — 87635 SARS-COV-2 COVID-19 AMP PRB: CPT | Performed by: EMERGENCY MEDICINE

## 2021-09-05 PROCEDURE — 85004 AUTOMATED DIFF WBC COUNT: CPT | Performed by: EMERGENCY MEDICINE

## 2021-09-05 PROCEDURE — C9803 HOPD COVID-19 SPEC COLLECT: HCPCS | Performed by: EMERGENCY MEDICINE

## 2021-09-05 PROCEDURE — 99282 EMERGENCY DEPT VISIT SF MDM: CPT | Performed by: EMERGENCY MEDICINE

## 2021-09-05 PROCEDURE — 99283 EMERGENCY DEPT VISIT LOW MDM: CPT | Performed by: EMERGENCY MEDICINE

## 2021-09-05 PROCEDURE — 83605 ASSAY OF LACTIC ACID: CPT | Performed by: EMERGENCY MEDICINE

## 2021-09-05 PROCEDURE — 87040 BLOOD CULTURE FOR BACTERIA: CPT | Mod: 91 | Performed by: EMERGENCY MEDICINE

## 2021-09-05 PROCEDURE — 80053 COMPREHEN METABOLIC PANEL: CPT | Performed by: EMERGENCY MEDICINE

## 2021-09-05 PROCEDURE — 36415 COLL VENOUS BLD VENIPUNCTURE: CPT | Performed by: EMERGENCY MEDICINE

## 2021-09-05 ASSESSMENT — ENCOUNTER SYMPTOMS
ABDOMINAL PAIN: 0
FEVER: 1
SHORTNESS OF BREATH: 0

## 2021-09-06 NOTE — DISCHARGE INSTRUCTIONS
Blood tests looked OK.   Blood cultures as in process.   Follow up with primary doctor in next one week.   Be seen if worsening symptoms.   Tylenol/ibuprofen as needed for fevers.

## 2021-09-06 NOTE — TELEPHONE ENCOUNTER
Triage Call:   Patient has been having a fever since 8/25/2021. Oral temp 102.0, has been using motrin. States he had a negative covid test. Denied sob, no chest pain/pressure. Around the time it started patient has no BM for 3 days but is now having BMs , some are loose stools. Having chills, slight cough, body aches    Paging on call provider Winston Miranda at 8:05pm via paging - no response. Second page sent at 8:17pm. MD contacted nurse at 8:19pm.     Per MD - Patient needs to be seen in the ED to be evaluated. If the patient refused he needs to get a virtual visit or to a e-visit to get tested for covid again before being seen in clinic.     Patient was called back and given Dr Miranda's advisement to be seen in the ED. Patient stated understanding and stated he would go into the [facility].     PCP or covering provider please follow up with patient.     COVID 19 Nurse Triage Plan/Patient Instructions    Please be aware that novel coronavirus (COVID-19) may be circulating in the community. If you develop symptoms such as fever, cough, or SOB or if you have concerns about the presence of another infection including coronavirus (COVID-19), please contact your health care provider or visit [email or web address].     Disposition/Instructions    ED Visit recommended. Follow protocol based instructions.     Bring Your Own Device:  Please also bring your smart device(s) (smart phones, tablets, laptops) and their charging cables for your personal use and to communicate with your care team during your visit.    Thank you for taking steps to prevent the spread of this virus.  o Limit your contact with others.  o Wear a simple mask to cover your cough.  o Wash your hands well and often.    Resources     [email or web address] [place]: About COVID-19: [email or web address]    CDC: What to Do If You're Sick: www.cdc.gov/coronavirus/2019-ncov/about/steps-when-sick.html    CDC: Ending Home Isolation:  www.cdc.gov/coronavirus/2019-ncov/hcp/disposition-in-home-patients.html     CDC: Caring for Someone: www.cdc.gov/coronavirus/2019-ncov/if-you-are-sick/care-for-someone.html     [facility]: Interim Guidance for Hospital Discharge to Home: www.health.[email or web address].[email or web address]/diseases/coronavirus/hcp/hospdischarge.pdf    [facility] clinical trials (COVID-19 research studies): [email or web address]-[email or web address]     Below are the COVID-19 hotlines at the [place] Department of Health ([facility]). Interpreters are available.   o For health questions: Call 078-321-3625 or 1-205.189.5984 (7 a.m. to 7 p.m.)  o For questions about schools and childcare: Call 496-964-8571 or 1-675.291.1062 (7 a.m. to 7 p.m.)     Sandra Burt RN Nursing Advisor 9/5/2021 8:41 PM     Reason for Disposition    [1] Fever > 101 F (38.3 C) AND [2] age > 60 years    Additional Information    Negative: SEVERE difficulty breathing (e.g., struggling for each breath, speaks in single words)    Negative: Difficult to awaken or acting confused (e.g., disoriented, slurred speech)    Negative: Bluish (or gray) lips or face now    Negative: Shock suspected (e.g., cold/pale/clammy skin, too weak to stand, low BP, rapid pulse)    Negative: Sounds like a life-threatening emergency to the triager    Negative: [1] COVID-19 exposure AND [2] has not completed COVID-19 vaccine series AND [3] no symptoms    Negative: [1] COVID-19 exposure AND [2] completed COVID-19 vaccine series (fully vaccinated) AND [3] no symptoms    Negative: COVID-19 vaccine reaction suspected (e.g., fever, headache, muscle aches) occurring during days 1-3 after getting vaccine    Negative: COVID-19 vaccine, questions about    Negative: [1] COVID-19 vaccine series completed (fully vaccinated) in past 3 months AND [2] new-onset of COVID-19 symptoms BUT [3] no known exposure    Negative: [1] Had lab test confirmed COVID-19 infection within last 3 monthsAND [2] new-onset of COVID-19 symptoms BUT [3]  no known exposure    Negative: [1] Lives with someone known to have influenza (flu test positive) AND [2] flu-like symptoms (e.g., cough, runny nose, sore throat, SOB; with or without fever)    Negative: [1] Adult with possible COVID-19 symptoms AND [2] triager concerned about severity of symptoms or other causes    Negative: COVID-19 and breastfeeding, questions about    Negative: SEVERE or constant chest pain or pressure (Exception: mild central chest pain, present only when coughing)    Negative: MODERATE difficulty breathing (e.g., speaks in phrases, SOB even at rest, pulse 100-120)    Negative: [1] Headache AND [2] stiff neck (can't touch chin to chest)    Negative: MILD difficulty breathing (e.g., minimal/no SOB at rest, SOB with walking, pulse <100)    Negative: Chest pain or pressure    Negative: Patient sounds very sick or weak to the triager    Negative: Fever > 103 F (39.4 C)    Protocols used: CORONAVIRUS (COVID-19) DIAGNOSED OR [id number] 3.25

## 2021-09-06 NOTE — ED PROVIDER NOTES
"  History     Chief Complaint   Patient presents with     Fever     HPI  Steffen Mitchell is a 71 year old male who presents to the emergency department with concerns regarding fever.  Patient reports intermittent fever over approximately the past 10 days.  He reports he will have occasional fevers, with no triggering or temporal relationship to the fevers.  1 day he will have a fever, and the next day he will not.  Approximately 10 days ago with the onset of fever patient states he had \"flulike symptoms\" with mild abdominal upset.  No diarrhea.  No nausea, vomiting.  No ill contacts.  Patient is vaccinated for Covid.  No urinary symptoms.  No cough, chest pain, shortness of breath.  Denies any severe headache.  No earache.  No sore throat.  No rash.  No traveling.    Allergies:  Allergies   Allergen Reactions     Bees      Bee Stings         Problem List:    Patient Active Problem List    Diagnosis Date Noted     Screening for prostate cancer 03/02/2020     Priority: Medium     Agrees harms outweigh benefits of screening.  See 3/2/20 visit.         Advanced directives, counseling/discussion 07/11/2012     Priority: Medium     Patient states has Advance Directive and will bring in a copy to clinic. 7/11/2012          Family history of diabetes mellitus (DM) 07/11/2012     Priority: Medium     FATHER AND BROTHER       CARDIOVASCULAR SCREENING; LDL GOAL LESS THAN 160 10/31/2010     Priority: Medium     Subjective tinnitus 08/01/2007     Priority: Medium     FAMILY HISTORY OF CARDIOVASC DIS (ISCHEMIC) 06/04/2007     Priority: Medium     ALLERGY   **  SEE ALSO ALLERGIC REAC BEE STING 06/04/2007     Priority: Medium     FAMILY HISTORY OF DIABETES MELLITUS 06/04/2007     Priority: Medium     Sensorineural hearing loss 06/04/2007     Priority: Medium     Problem list name updated by automated process. Provider to review          Past Medical History:    No past medical history on file.    Past Surgical History:    Past " Surgical History:   Procedure Laterality Date     COLONOSCOPY N/A 2015    Procedure: COLONOSCOPY;  Surgeon: Carlos Ovalles MD;  Location: [facility]     SURGICAL HISTORY OF -   2003     Colonoscopy and polypectomy with snare     SURGICAL HISTORY OF -   1984     Arthroscoopy Arthroscopic medial meniscectomy Left Knee     SURGICAL HISTORY OF -   3/29/2002    Right Knee,partial medial meniscectomy        Family History:    Family History   Problem Relation Age of Onset     Hypertension Mother      Cerebrovascular Disease Mother      Hypertension Father      Cerebrovascular Disease Father 70     Diabetes Father      Eye Disorder Father         glacoma     Coronary Artery Disease Father      C.A.D. Brother         bypass surgery at age 55     Cardiovascular Brother         pulmonary hypertention     Respiratory Brother         sleep apnea     Heart Disease Brother         triple bypass       Social History:  Marital Status:   [2]  Social History     Tobacco Use     Smoking status: Former Smoker     Years: 10.00     Quit date: 1975     Years since quittin.7     Smokeless tobacco: Former User     Quit date: 1980   Substance Use Topics     Alcohol use: Yes     Comment: occ has wine with dinner     Drug use: No        Medications:    ALEVE 220 MG OR TABS  ASPIRIN 81 MG OR TABS  ciclopirox (LOPROX) 0.77 % cream  clobetasol (TEMOVATE) 0.05 % external solution  desonide (DESOWEN) 0.05 % external cream  EPINEPHrine (ADRENACLICK) 0.3 MG/0.3ML injection  ketoconazole (NIZORAL) 2 % external shampoo          Review of Systems   Constitutional: Positive for fever.   Respiratory: Negative for shortness of breath.    Cardiovascular: Negative for chest pain.   Gastrointestinal: Negative for abdominal pain.   All other systems reviewed and are negative.      Physical Exam   BP: (!) 155/89  Pulse: 79  Temp: 97.3  F (36.3  C)  Resp: 16  Weight: 83.9 kg (185 lb)  SpO2: 97 %      Physical Exam  BP  125/78   Pulse 69   Temp 97.3  F (36.3  C) (Temporal)   Resp 16   Wt 83.9 kg (185 lb)   SpO2 98%   BMI 28.98 kg/m    General: alert, interactive, in no apparent distress  Head: atraumatic  Nose: no rhinorrhea or epistaxis  Ears: no external auditory canal discharge or bleeding.    Eyes: Sclera nonicteric. Conjunctiva noninjected.    Mouth: no tonsillar erythema, edema, or exudate  Neck: supple, no palp LAD  Lungs: CTAB  CV: RRR, S1/S2; peripheral pulses palpable and symmetric  Abdomen: soft, nt, nd, no guarding or rebound. Positive bowel sounds  Extremities: no cyanosis or edema  Skin: no rash or diaphoresis  Neuro:   strength 5/5 in UE and LEs bilaterally, sensation intact to light touch in UE and LEs bilaterally;       ED Course        Procedures              Critical Care time:  none               Results for orders placed or performed during the hospital encounter of 09/05/21 (from the past 24 hour(s))   CBC with platelets differential    Narrative    The following orders were created for panel order CBC with platelets differential.  Procedure                               Abnormality         Status                     ---------                               -----------         ------                     CBC with platelets and d...[[id number]]  Abnormal            Final result                 Please view results for these tests on the individual orders.   Comprehensive metabolic panel   Result Value Ref Range    Sodium 138 133 - 144 mmol/L    Potassium 4.3 3.4 - 5.3 mmol/L    Chloride 106 94 - 109 mmol/L    Carbon Dioxide (CO2) 26 20 - 32 mmol/L    Anion Gap 6 3 - 14 mmol/L    Urea Nitrogen 24 7 - 30 mg/dL    Creatinine 1.25 0.66 - 1.25 mg/dL    Calcium 7.9 (L) 8.5 - 10.1 mg/dL    Glucose 114 (H) 70 - 99 mg/dL    Alkaline Phosphatase 81 40 - 150 U/L    AST 17 0 - 45 U/L    ALT 32 0 - 70 U/L    Protein Total 7.3 6.8 - 8.8 g/dL    Albumin 2.8 (L) 3.4 - 5.0 g/dL    Bilirubin Total 0.2 0.2 - 1.3 mg/dL    GFR  Estimate 58 (L) >60 mL/min/1.73m2   Lactic acid whole blood   Result Value Ref Range    Lactic Acid 0.9 0.7 - 2.0 mmol/L   CBC with platelets and differential   Result Value Ref Range    WBC Count 7.7 4.0 - 11.0 10e3/uL    RBC Count 4.10 (L) 4.40 - 5.90 10e6/uL    Hemoglobin 10.3 (L) 13.3 - 17.7 g/dL    Hematocrit 32.0 (L) 40.0 - 53.0 %    MCV 78 78 - 100 fL    MCH 25.1 (L) 26.5 - 33.0 pg    MCHC 32.2 31.5 - 36.5 g/dL    RDW 15.9 (H) 10.0 - 15.0 %    Platelet Count 288 150 - 450 10e3/uL    % Neutrophils 81 %    % Lymphocytes 10 %    % Monocytes 7 %    % Eosinophils 2 %    % Basophils 0 %    % Immature Granulocytes 0 %    NRBCs per 100 WBC 0 <1 /100    Absolute Neutrophils 6.2 1.6 - 8.3 10e3/uL    Absolute Lymphocytes 0.7 (L) 0.8 - 5.3 10e3/uL    Absolute Monocytes 0.6 0.0 - 1.3 10e3/uL    Absolute Eosinophils 0.1 0.0 - 0.7 10e3/uL    Absolute Basophils 0.0 0.0 - 0.2 10e3/uL    Absolute Immature Granulocytes 0.0 <=0.0 10e3/uL    Absolute NRBCs 0.0 10e3/uL       Medications - No data to display    Assessments & Plan (with Medical Decision Making)  71 year old male presenting to the emergency department with concerns regarding fever, present over the past 10 days, intermittent, without any other associated symptoms.  Has had slight constipation related changes, with occasional loose stool.  No other obvious infectious source.  Blood work performed including CBC, CMP, and blood cultures.  CMP unremarkable.  CBC with white blood cell count which is normal.  Blood cultures pending.  No murmur, or skin findings to suggest endocarditis.  Patient without tick exposures.  No LFT abnormality, or thrombocytopenia to suggest tickborne illness.  Patient encouraged close monitoring of symptoms, follow-up with clinic as needed, and be seen if new or worsening symptoms.     I have reviewed the nursing notes.    I have reviewed the findings, diagnosis, plan and need for follow up with the patient.       New Prescriptions    No  medications on file       Final diagnoses:   Fever, unspecified       9/5/2021   [facility] EMERGENCY DEPT     Michael Dickinson MD  09/05/21 [id number]

## 2021-09-09 ENCOUNTER — OFFICE VISIT (OUTPATIENT)
Dept: FAMILY MEDICINE | Facility: CLINIC | Age: 71
End: 2021-09-09
Payer: COMMERCIAL

## 2021-09-09 VITALS
DIASTOLIC BLOOD PRESSURE: 60 MMHG | HEIGHT: 67 IN | TEMPERATURE: 99.4 F | SYSTOLIC BLOOD PRESSURE: 118 MMHG | WEIGHT: 184 LBS | BODY MASS INDEX: 28.88 KG/M2 | RESPIRATION RATE: 18 BRPM | HEART RATE: 70 BPM

## 2021-09-09 DIAGNOSIS — R14.0 BLOATING SYMPTOM: ICD-10-CM

## 2021-09-09 DIAGNOSIS — R50.9 FEVER AND CHILLS: Primary | ICD-10-CM

## 2021-09-09 LAB
ALBUMIN SERPL-MCNC: 3.2 G/DL (ref 3.4–5)
ALP SERPL-CCNC: 76 U/L (ref 40–150)
ALT SERPL W P-5'-P-CCNC: 33 U/L (ref 0–70)
ANION GAP SERPL CALCULATED.3IONS-SCNC: 3 MMOL/L (ref 3–14)
AST SERPL W P-5'-P-CCNC: 20 U/L (ref 0–45)
BASOPHILS # BLD AUTO: 0 10E3/UL (ref 0–0.2)
BASOPHILS NFR BLD AUTO: 0 %
BILIRUB SERPL-MCNC: 0.2 MG/DL (ref 0.2–1.3)
BUN SERPL-MCNC: 23 MG/DL (ref 7–30)
CALCIUM SERPL-MCNC: 7.8 MG/DL (ref 8.5–10.1)
CHLORIDE BLD-SCNC: 105 MMOL/L (ref 94–109)
CO2 SERPL-SCNC: 29 MMOL/L (ref 20–32)
CREAT SERPL-MCNC: 1.14 MG/DL (ref 0.66–1.25)
EOSINOPHIL # BLD AUTO: 0.1 10E3/UL (ref 0–0.7)
EOSINOPHIL NFR BLD AUTO: 3 %
ERYTHROCYTE [DISTWIDTH] IN BLOOD BY AUTOMATED COUNT: 16.2 % (ref 10–15)
GFR SERPL CREATININE-BSD FRML MDRD: 64 ML/MIN/1.73M2
GLUCOSE BLD-MCNC: 112 MG/DL (ref 70–99)
HCT VFR BLD AUTO: 32.9 % (ref 40–53)
HGB BLD-MCNC: 10.7 G/DL (ref 13.3–17.7)
LYMPHOCYTES # BLD AUTO: 0.9 10E3/UL (ref 0.8–5.3)
LYMPHOCYTES NFR BLD AUTO: 17 %
MCH RBC QN AUTO: 25 PG (ref 26.5–33)
MCHC RBC AUTO-ENTMCNC: 32.5 G/DL (ref 31.5–36.5)
MCV RBC AUTO: 77 FL (ref 78–100)
MONOCYTES # BLD AUTO: 0.7 10E3/UL (ref 0–1.3)
MONOCYTES NFR BLD AUTO: 14 %
MONOCYTES NFR BLD AUTO: NEGATIVE %
NEUTROPHILS # BLD AUTO: 3.4 10E3/UL (ref 1.6–8.3)
NEUTROPHILS NFR BLD AUTO: 67 %
PLATELET # BLD AUTO: 324 10E3/UL (ref 150–450)
POTASSIUM BLD-SCNC: 4.6 MMOL/L (ref 3.4–5.3)
PROT SERPL-MCNC: 7.1 G/DL (ref 6.8–8.8)
RBC # BLD AUTO: 4.28 10E6/UL (ref 4.4–5.9)
SODIUM SERPL-SCNC: 137 MMOL/L (ref 133–144)
WBC # BLD AUTO: 5 10E3/UL (ref 4–11)

## 2021-09-09 PROCEDURE — 80053 COMPREHEN METABOLIC PANEL: CPT | Performed by: FAMILY MEDICINE

## 2021-09-09 PROCEDURE — 86645 CMV ANTIBODY IGM: CPT | Performed by: FAMILY MEDICINE

## 2021-09-09 PROCEDURE — 36415 COLL VENOUS BLD VENIPUNCTURE: CPT | Performed by: FAMILY MEDICINE

## 2021-09-09 PROCEDURE — 86753 PROTOZOA ANTIBODY NOS: CPT | Mod: 90 | Performed by: FAMILY MEDICINE

## 2021-09-09 PROCEDURE — 87798 DETECT AGENT NOS DNA AMP: CPT | Mod: 90 | Performed by: FAMILY MEDICINE

## 2021-09-09 PROCEDURE — 86308 HETEROPHILE ANTIBODY SCREEN: CPT | Performed by: FAMILY MEDICINE

## 2021-09-09 PROCEDURE — 99000 SPECIMEN HANDLING OFFICE-LAB: CPT | Performed by: FAMILY MEDICINE

## 2021-09-09 PROCEDURE — 86666 EHRLICHIA ANTIBODY: CPT | Mod: 90 | Performed by: FAMILY MEDICINE

## 2021-09-09 PROCEDURE — 99214 OFFICE O/P EST MOD 30 MIN: CPT | Performed by: FAMILY MEDICINE

## 2021-09-09 PROCEDURE — 86618 LYME DISEASE ANTIBODY: CPT | Performed by: FAMILY MEDICINE

## 2021-09-09 PROCEDURE — 85025 COMPLETE CBC W/AUTO DIFF WBC: CPT | Performed by: FAMILY MEDICINE

## 2021-09-09 RX ORDER — DOXYCYCLINE HYCLATE 100 MG
100 TABLET ORAL 2 TIMES DAILY
Qty: 28 TABLET | Refills: 0 | Status: SHIPPED | OUTPATIENT
Start: 2021-09-09 | End: 2021-09-23

## 2021-09-09 ASSESSMENT — MIFFLIN-ST. JEOR: SCORE: 1548.25

## 2021-09-09 NOTE — PATIENT INSTRUCTIONS
Patient Education     Febrile Illness with Uncertain Cause (Adult)   You have a fever, but the cause is unknown. A fever is the body's natural reaction to an illness such as infection due to a virus or bacteria. Sometimes other conditions such as cancer or immune diseases can cause fever. This might be more likely if your fever has lasted for more than a week or 2. In most cases, the higher temperature itself isn't harmful. It actually helps the body fight infections. A fever doesn't need to be treated unless you feel very uncomfortable.   Sometimes a fever can be an early sign of a more serious infection. So follow up if your condition gets worse.   Home care  Unless given other instructions by your healthcare provider, follow these guidelines when caring for yourself at home.   General care    If your symptoms are not severe, rest at home for the first 2 to 3 days. When you are active again, don't let yourself get too tired.    For your overall health, don't smoke. Also stay away from secondhand smoke.    You may not feel like eating too much. So a light diet is fine. Stay hydrated by drinking 6 to 8 glasses of fluids per day. This includes water, soft drinks, sports drinks, juices, tea, or soup. If you have congestion, extra fluids will help loosen secretions in the nose and lungs.  Medicines    You can take acetaminophen or ibuprofen for pain or to lower your temperature, unless you were given a different medicine to use. If you have chronic liver or kidney disease or have ever had a stomach ulcer or gastrointestinal bleeding, talk with your healthcare provider before using these medicines. Also talk with your provider if you are taking medicine to prevent blood clots. Don't give aspirin to anyone younger than age 19 unless directed by the provider. It may cause a serious illness called Reye syndrome. This most often affects the brain and liver.    If you were given antibiotics for an infection, take them  until they are used up, or your healthcare provider tells you to stop. It's important to finish the antibiotics even if you feel better. This is to make sure the infection has cleared. Antibiotics are not often given for a viral infection or a fever with an unknown cause.    Over-the-counter medicines will not shorten the length of the illness. But they may be help with symptoms. These include cough, sore throat, or nasal and sinus congestion. Ask your pharmacist for product suggestions. Don't use decongestants if you have high blood pressure.    Follow-up care  Follow up with your healthcare provider, or as advised.    If a culture or other lab tests were done, you will be told if your treatment needs to be changed. Call your healthcare provider as directed for the results.    If X-rays, a CT scan, MRI, or an ultrasound were done, a specialist will review them. You will be told of any findings that may affect your care. Call your healthcare provider as directed for the results.  Call 911  Call 911if any of these occur:     Trouble breathing or swallowing, or wheezing    Chest pain    Confusion    Extreme drowsiness or trouble waking up    Fainting or loss of consciousness    Fast heart rate    Low blood pressure    Vomiting blood, or large amounts of blood in stool    Seizure  When to get medical advice  Call your healthcare provider right away if any of these occur:     Cough with lots of colored sputum (mucus) or blood in your sputum    Severe headache    Face, neck, throat, or ear pain    Feeling drowsy    Belly pain    Repeated vomiting or diarrhea; bloody diarrhea    Joint pain or a new rash    Burning when urinating    Fever of 100.4 F (38 C) or higher, or as directed by your provider    Shaking chills    Feeling weak or dizzy  [employer] last reviewed this educational content on 1/1/2020 2000-2021 [employer]. All rights reserved. This information is not intended as a substitute for  professional medical care. Always follow your healthcare professional's instructions.

## 2021-09-09 NOTE — NURSING NOTE
"Chief Complaint   Patient presents with     ER F/U     /60 (Cuff Size: Adult Large)   Pulse 70   Temp 99.4  F (37.4  C) (Tympanic)   Resp 18   Ht 1.702 m (5' 7\")   Wt 83.5 kg (184 lb)   BMI 28.82 kg/m   Estimated body mass index is 28.82 kg/m  as calculated from the following:    Height as of this encounter: 1.702 m (5' 7\").    Weight as of this encounter: 83.5 kg (184 lb).  Patient presents to the clinic using No DME      Health Maintenance that is potentially due pending provider review:    Health Maintenance Due   Topic Date Due     ANNUAL REVIEW OF HM ORDERS  Never done     DTAP/TDAP/TD IMMUNIZATION (2 - Td or Tdap) 01/01/2009     COLORECTAL CANCER SCREENING  05/13/2020     MEDICARE ANNUAL WELLNESS VISIT  03/02/2021     FALL RISK ASSESSMENT  03/02/2021     INFLUENZA VACCINE (1) 09/01/2021                "

## 2021-09-09 NOTE — PROGRESS NOTES
Assessment & Plan     Fever and chills  Patient was seen in ER recently, experiencing fever, chills intermittently for last 2 weeks or so, fever spikes more so during evening hours.  Physical examination unremarkable.  Differentials discussed in detail including infectious etiology.  Lyme's, Ehrlichia, Anaplasma, Babesia, CBC, CMP, mono screen, CMV and ultrasound abdomen ordered.  Blood cultures negative so far.  Shared decision made to try doxycycline for suspected tickborne illness.  Recommended to continue healthy diet, well hydration, rest and to follow-up in 1 week or earlier if needed.  Patient understood and in agreement with above plan.  All questions answered.  - Lyme Disease Sully with reflex to WB Serum; Future  - Ehrlichia Anaplasma Sp by PCR; Future  - Anaplasma  phagocytoph antibody IgM; Future  - Babesia antibody IgG IgM; Future  - CBC with platelets and differential; Future  - Mononucleosis screen; Future  - CMV antibody IgM; Future  - doxycycline hyclate (VIBRA-TABS) 100 MG tablet; Take 1 tablet (100 mg) by mouth 2 times daily for 14 days  - Comprehensive metabolic panel (BMP + Alb, Alk Phos, ALT, AST, Total. Bili, TP); Future    Bloating symptom  - US Abdomen Limited; Future      Return in about 1 week (around 9/16/2021).      Kris Godoy MD  [facility]    Alem Mitchell is a 71 year old who presents for the following health issues     HPI     ED/UC Followup:     Facility:  [facility]   Date of visit: 9/5/2021  Reason for visit: Fever and chills, some bloating  Current Status: Feeling better today but symptoms fluctuating. Had a fever yesterday of 100 morning and 99 last night. No fevers today.  Normal appetite, no other relevant systemic symptoms.       Review of Systems   Constitutional, HEENT, cardiovascular, pulmonary, GI, , musculoskeletal, neuro, skin, endocrine and psych systems are negative, except as otherwise noted.      Objective    /60 (Cuff  "Size: Adult Large)   Pulse 70   Temp 99.4  F (37.4  C) (Tympanic)   Resp 18   Ht 1.702 m (5' 7\")   Wt 83.5 kg (184 lb)   BMI 28.82 kg/m    Body mass index is 28.82 kg/m .  Physical Exam   GENERAL: alert, no distress and over weight  EYES: Eyes grossly normal to inspection, PERRL and conjunctivae and sclerae normal  HENT: normal cephalic/atraumatic, nose and mouth without ulcers or lesions, oropharynx clear and oral mucous membranes moist  NECK: no adenopathy, no asymmetry, masses, or scars and thyroid normal to palpation  RESP: lungs clear to auscultation - no rales, rhonchi or wheezes  CV: regular rate and rhythm, normal S1 S2, no S3 or S4, no murmur, click or rub, no peripheral edema and peripheral pulses strong  ABDOMEN: soft, nontender, no hepatosplenomegaly, no masses  MS: no gross musculoskeletal defects noted, no edema  SKIN: no suspicious lesions or rashes  NEURO: Normal strength and tone, mentation intact and speech normal  PSYCH: mentation appears normal, affect normal/bright    Wt Readings from Last 10 Encounters:   09/09/21 83.5 kg (184 lb)   09/05/21 83.9 kg (185 lb)   03/02/20 82.1 kg (181 lb)   03/20/19 86.6 kg (191 lb)   02/12/19 86.6 kg (191 lb)   03/30/17 82.1 kg (181 lb)   09/16/16 86.6 kg (191 lb)   05/13/15 83.9 kg (185 lb)   05/11/15 83.9 kg (185 lb)   05/05/15 84.6 kg (186 lb 9.6 oz)           "

## 2021-09-10 LAB
B BURGDOR IGG+IGM SER QL: 0.07
CMV IGM SERPL IA-ACNC: <8 AU/ML
CMV IGM SERPL IA-ACNC: NEGATIVE

## 2021-09-11 LAB
BACTERIA BLD CULT: NO GROWTH
BACTERIA BLD CULT: NO GROWTH

## 2021-09-12 ENCOUNTER — HEALTH MAINTENANCE LETTER (OUTPATIENT)
Age: 71
End: 2021-09-12

## 2021-09-12 LAB
B MICROTI IGG TITR SER: NORMAL {TITER}
B MICROTI IGM TITR SER: NORMAL {TITER}

## 2021-09-13 LAB — A PHAGOCYTOPH IGM TITR SER IF: NORMAL {TITER}

## 2021-09-14 ENCOUNTER — HOSPITAL ENCOUNTER (OUTPATIENT)
Dept: ULTRASOUND IMAGING | Facility: CLINIC | Age: 71
Discharge: HOME | End: 2021-09-14
Attending: FAMILY MEDICINE | Admitting: FAMILY MEDICINE
Payer: COMMERCIAL

## 2021-09-14 DIAGNOSIS — R14.0 BLOATING SYMPTOM: ICD-10-CM

## 2021-09-14 DIAGNOSIS — D64.9 ANEMIA, UNSPECIFIED TYPE: Primary | ICD-10-CM

## 2021-09-14 LAB
A PHAGOCYTOPH DNA BLD QL NAA+PROBE: NOT DETECTED
E CHAFFEENSIS DNA BLD QL NAA+PROBE: NOT DETECTED
E EWINGII DNA SPEC QL NAA+PROBE: NOT DETECTED
EHRLICHIA DNA SPEC QL NAA+PROBE: NOT DETECTED

## 2021-09-14 PROCEDURE — 76705 ECHO EXAM OF ABDOMEN: CPT

## 2021-09-14 NOTE — PROGRESS NOTES
Discussed lab results with patient.  Symptoms resolved almost completely 4 days ago.  Appetite good, no abdominal pain, rectal bleeding or other relevant systemic symptoms.  Will repeat hematinics in 1 week, order placed.  Recommended to continue iron supplement.  Will consider CT abdomen/pelvis and endoscopy if indicated.  All questions answered.      Dr Godoy

## 2021-09-23 ENCOUNTER — LAB (OUTPATIENT)
Dept: LAB | Facility: CLINIC | Age: 71
End: 2021-09-23
Payer: COMMERCIAL

## 2021-09-23 DIAGNOSIS — D64.9 ANEMIA, UNSPECIFIED TYPE: ICD-10-CM

## 2021-09-23 LAB
ERYTHROCYTE [DISTWIDTH] IN BLOOD BY AUTOMATED COUNT: 17.7 % (ref 10–15)
FERRITIN SERPL-MCNC: 16 NG/ML (ref 26–388)
FOLATE SERPL-MCNC: 14.5 NG/ML
HCT VFR BLD AUTO: 38 % (ref 40–53)
HGB BLD-MCNC: 12.1 G/DL (ref 13.3–17.7)
IRON SATN MFR SERPL: 12 % (ref 15–46)
IRON SERPL-MCNC: 46 UG/DL (ref 35–180)
MCH RBC QN AUTO: 25.5 PG (ref 26.5–33)
MCHC RBC AUTO-ENTMCNC: 31.8 G/DL (ref 31.5–36.5)
MCV RBC AUTO: 80 FL (ref 78–100)
PLATELET # BLD AUTO: 207 10E3/UL (ref 150–450)
RBC # BLD AUTO: 4.74 10E6/UL (ref 4.4–5.9)
TIBC SERPL-MCNC: 375 UG/DL (ref 240–430)
VIT B12 SERPL-MCNC: 336 PG/ML (ref 193–986)
WBC # BLD AUTO: 4.1 10E3/UL (ref 4–11)

## 2021-09-23 PROCEDURE — 82607 VITAMIN B-12: CPT

## 2021-09-23 PROCEDURE — 82728 ASSAY OF FERRITIN: CPT

## 2021-09-23 PROCEDURE — 82746 ASSAY OF FOLIC ACID SERUM: CPT

## 2021-09-23 PROCEDURE — 85027 COMPLETE CBC AUTOMATED: CPT

## 2021-09-23 PROCEDURE — 83550 IRON BINDING TEST: CPT

## 2021-09-23 PROCEDURE — 36415 COLL VENOUS BLD VENIPUNCTURE: CPT

## 2021-10-18 ENCOUNTER — IMMUNIZATION (OUTPATIENT)
Dept: FAMILY MEDICINE | Facility: CLINIC | Age: 71
End: 2021-10-18
Payer: COMMERCIAL

## 2021-10-18 DIAGNOSIS — Z23 NEED FOR PROPHYLACTIC VACCINATION AND INOCULATION AGAINST INFLUENZA: Primary | ICD-10-CM

## 2021-10-18 PROCEDURE — 90662 IIV NO PRSV INCREASED AG IM: CPT

## 2021-10-18 PROCEDURE — 99207 PR NO CHARGE NURSE ONLY: CPT

## 2021-10-18 PROCEDURE — G0008 ADMIN INFLUENZA VIRUS VAC: HCPCS

## 2022-02-09 ASSESSMENT — ENCOUNTER SYMPTOMS
DIZZINESS: 0
NAUSEA: 0
ARTHRALGIAS: 1
FEVER: 0
DYSURIA: 0
COUGH: 0
HEADACHES: 0
SORE THROAT: 0
DIARRHEA: 0
EYE PAIN: 0
ABDOMINAL PAIN: 0
HEMATOCHEZIA: 0
NERVOUS/ANXIOUS: 0
CHILLS: 0
FREQUENCY: 0
HEMATURIA: 0
SHORTNESS OF BREATH: 0
CONSTIPATION: 0
PALPITATIONS: 0
MYALGIAS: 0
WEAKNESS: 0
HEARTBURN: 0
JOINT SWELLING: 0
PARESTHESIAS: 1

## 2022-02-09 ASSESSMENT — ACTIVITIES OF DAILY LIVING (ADL): CURRENT_FUNCTION: NO ASSISTANCE NEEDED

## 2022-02-10 ENCOUNTER — OFFICE VISIT (OUTPATIENT)
Dept: FAMILY MEDICINE | Facility: CLINIC | Age: 72
End: 2022-02-10
Payer: COMMERCIAL

## 2022-02-10 VITALS
SYSTOLIC BLOOD PRESSURE: 138 MMHG | WEIGHT: 179.6 LBS | BODY MASS INDEX: 28.19 KG/M2 | OXYGEN SATURATION: 97 % | HEART RATE: 73 BPM | RESPIRATION RATE: 18 BRPM | TEMPERATURE: 97.9 F | DIASTOLIC BLOOD PRESSURE: 84 MMHG | HEIGHT: 67 IN

## 2022-02-10 DIAGNOSIS — Z00.00 MEDICARE ANNUAL WELLNESS VISIT, SUBSEQUENT: Primary | ICD-10-CM

## 2022-02-10 DIAGNOSIS — L08.1 ERYTHRASMA: ICD-10-CM

## 2022-02-10 PROCEDURE — 99397 PER PM REEVAL EST PAT 65+ YR: CPT | Performed by: FAMILY MEDICINE

## 2022-02-10 PROCEDURE — 99213 OFFICE O/P EST LOW 20 MIN: CPT | Mod: 25 | Performed by: FAMILY MEDICINE

## 2022-02-10 ASSESSMENT — ENCOUNTER SYMPTOMS
COUGH: 0
ARTHRALGIAS: 1
JOINT SWELLING: 0
CHILLS: 0
SORE THROAT: 0
HEADACHES: 0
WEAKNESS: 0
NAUSEA: 0
PARESTHESIAS: 1
DIARRHEA: 0
FREQUENCY: 0
HEMATURIA: 0
DYSURIA: 0
DIZZINESS: 0
SHORTNESS OF BREATH: 0
NERVOUS/ANXIOUS: 0
MYALGIAS: 0
ABDOMINAL PAIN: 0
CONSTIPATION: 0
HEMATOCHEZIA: 0
HEARTBURN: 0
FEVER: 0
EYE PAIN: 0
PALPITATIONS: 0

## 2022-02-10 ASSESSMENT — ACTIVITIES OF DAILY LIVING (ADL): CURRENT_FUNCTION: NO ASSISTANCE NEEDED

## 2022-02-10 ASSESSMENT — MIFFLIN-ST. JEOR: SCORE: 1524.32

## 2022-02-10 NOTE — PROGRESS NOTES
"SUBJECTIVE:   Steffen Mitchell is a 71 year old male who presents for Preventive Visit.      Patient has been advised of split billing requirements and indicates understanding: Yes  Are you in the first 12 months of your Medicare coverage?  No    Healthy Habits:     In general, how would you rate your overall health?  Excellent    Frequency of exercise:  2-3 days/week    Duration of exercise:  Less than 15 minutes    Do you usually eat at least 4 servings of fruit and vegetables a day, include whole grains    & fiber and avoid regularly eating high fat or \"junk\" foods?  No    Taking medications regularly:  Yes    Medication side effects:  Not applicable    Ability to successfully perform activities of daily living:  No assistance needed    Home Safety:  Lack of grab bars in the bathroom    Hearing Impairment:  Difficulty following a conversation in a noisy restaurant or crowded room    In the past 6 months, have you been bothered by leaking of urine?  No    In general, how would you rate your overall mental or emotional health?  Good      PHQ-2 Total Score: 0    Do you feel safe in your environment? Yes    Have you ever done Advance Care Planning? (For example, a Health Directive, POLST, or a discussion with a medical provider or your loved ones about your wishes): No, advance care planning information given to patient to review.  Patient declined advance care planning discussion at this time.       Fall risk  Fallen 2 or more times in the past year?: No  Any fall with injury in the past year?: No    Cognitive Screening   1) Repeat 3 items (Leader, Season, Table)    2) Clock draw: NORMAL  3) 3 item recall: Recalls 2 objects   Results: NORMAL clock, 1-2 items recalled: COGNITIVE IMPAIRMENT LESS LIKELY    Mini-CogTM Copyright SUELLEN Aguayo. Licensed by the author for use in [facility]; reprinted with permission (olman@.[facility]). All rights reserved.      Do you have sleep apnea, excessive snoring or daytime " drowsiness?: no    Reviewed and updated as needed this visit by clinical staff  Tobacco  Allergies  Meds   Med Hx  Surg Hx  Fam Hx  Soc Hx       Reviewed and updated as needed this visit by Provider               Social History     Tobacco Use     Smoking status: Former Smoker     Packs/day: 1.00     Years: 10.00     Pack years: 10.00     Types: Cigarettes     Start date: 1966     Quit date: 1975     Years since quittin.1     Smokeless tobacco: Former User     Quit date: 1980   Substance Use Topics     Alcohol use: Yes     Comment: occ has wine with dinner     If you drink alcohol do you typically have >3 drinks per day or >7 drinks per week? No    Alcohol Use 2022   Prescreen: >3 drinks/day or >7 drinks/week? No   Prescreen: >3 drinks/day or >7 drinks/week? -           Some rash in groin, red, itchy at times.  Not going away.       Current providers sharing in care for this patient include:   Patient Care Team:  Renny Rosado MD as PCP - General (Family Practice)  Kris Godoy MD as Assigned PCP    The following health maintenance items are reviewed in Epic and correct as of today:  Health Maintenance Due   Topic Date Due     ANNUAL REVIEW OF HM ORDERS  Never done     DTAP/TDAP/TD IMMUNIZATION (2 - Td or Tdap) 2009     COLORECTAL CANCER SCREENING  2020             Review of Systems   Constitutional: Negative for chills and fever.   HENT: Positive for hearing loss. Negative for congestion, ear pain and sore throat.    Eyes: Negative for pain and visual disturbance.   Respiratory: Negative for cough and shortness of breath.    Cardiovascular: Negative for chest pain, palpitations and peripheral edema.   Gastrointestinal: Negative for abdominal pain, constipation, diarrhea, heartburn, hematochezia and nausea.   Genitourinary: Negative for dysuria, frequency, genital sores, hematuria, impotence, penile discharge and urgency.   Musculoskeletal: Positive for arthralgias.  "Negative for joint swelling and myalgias.   Skin: Negative for rash.   Neurological: Positive for paresthesias. Negative for dizziness, weakness and headaches.   Psychiatric/Behavioral: Negative for mood changes. The patient is not nervous/anxious.        OBJECTIVE:   /84 (BP Location: Right arm, Patient Position: Sitting, Cuff Size: Adult Regular)   Pulse 73   Temp 97.9  F (36.6  C) (Tympanic)   Resp 18   Ht 1.695 m (5' 6.75\")   Wt 81.5 kg (179 lb 9.6 oz)   SpO2 97%   BMI 28.34 kg/m   Estimated body mass index is 28.34 kg/m  as calculated from the following:    Height as of this encounter: 1.695 m (5' 6.75\").    Weight as of this encounter: 81.5 kg (179 lb 9.6 oz).  Physical Exam  Gen: alert and oriented, in no acute distress, affect within normal limits  Neck: supple with no masses or nodes  Throat: oropharynx clear, no exudate or tonsillar/palate asymmetry.    CV: RRR, no murmur  Lungs: clear bilaterally with good effort  Abd: nontender, no mass  Ext: no edema or lesions   Neuro: moving all extremities, gait normal, no focal deficts noted  Has some violet/red areas, dry, in groin bilaterally.  C/w erythrasma vs. Chronic yeast.      ASSESSMENT / PLAN:   Well exam  Erythrasma    Can try antifungals for groin, would address either eerythrasma or yeast.  Consider topical clindamycin if antifungals not working.  Would try to avoid oral treatment for first line as it's not severe.      Reviewed labs, screening, etc.  Doing well.            COUNSELING:  Reviewed preventive health counseling, as reflected in patient instructions       Regular exercise       Healthy diet/nutrition    Estimated body mass index is 28.34 kg/m  as calculated from the following:    Height as of this encounter: 1.695 m (5' 6.75\").    Weight as of this encounter: 81.5 kg (179 lb 9.6 oz).    Weight management plan: diet/exercise    He reports that he quit smoking about 47 years ago. His smoking use included cigarettes. He started " smoking about 56 years ago. He has a 10.00 pack-year smoking history. He quit smokeless tobacco use about 42 years ago.      Appropriate preventive services were discussed with this patient, including applicable screening as appropriate for cardiovascular disease, diabetes, osteopenia/osteoporosis, and glaucoma.  As appropriate for age/gender, discussed screening for colorectal cancer, prostate cancer, breast cancer, and cervical cancer. Checklist reviewing preventive services available has been given to the patient.    Reviewed patients plan of care and provided an AVS. The Basic Care Plan (routine screening as documented in Health Maintenance) for Steffen meets the Care Plan requirement. This Care Plan has been established and reviewed with the Patient.        Gary Baltazar MD  [facility]    Identified Health Risks:

## 2022-11-09 ENCOUNTER — OFFICE VISIT (OUTPATIENT)
Dept: FAMILY MEDICINE | Facility: CLINIC | Age: 72
End: 2022-11-09
Payer: COMMERCIAL

## 2022-11-09 VITALS
TEMPERATURE: 97.8 F | HEIGHT: 67 IN | OXYGEN SATURATION: 97 % | WEIGHT: 183.8 LBS | RESPIRATION RATE: 20 BRPM | DIASTOLIC BLOOD PRESSURE: 72 MMHG | HEART RATE: 67 BPM | SYSTOLIC BLOOD PRESSURE: 120 MMHG | BODY MASS INDEX: 28.85 KG/M2

## 2022-11-09 DIAGNOSIS — R21 RASH AND NONSPECIFIC SKIN ERUPTION: Primary | ICD-10-CM

## 2022-11-09 PROCEDURE — 99213 OFFICE O/P EST LOW 20 MIN: CPT | Performed by: FAMILY MEDICINE

## 2022-11-09 RX ORDER — CLOTRIMAZOLE AND BETAMETHASONE DIPROPIONATE 10; .64 MG/G; MG/G
CREAM TOPICAL 2 TIMES DAILY
Qty: 45 G | Refills: 1 | Status: SHIPPED | OUTPATIENT
Start: 2022-11-09 | End: 2022-11-23

## 2022-11-09 RX ORDER — TERBINAFINE HYDROCHLORIDE 250 MG/1
250 TABLET ORAL DAILY
Qty: 14 TABLET | Refills: 0 | Status: SHIPPED | OUTPATIENT
Start: 2022-11-09 | End: 2022-11-23

## 2022-11-09 ASSESSMENT — PAIN SCALES - GENERAL: PAINLEVEL: NO PAIN (0)

## 2022-11-09 NOTE — PROGRESS NOTES
"  Assessment & Plan     Rash and nonspecific skin eruption  Differentials discussed in detail including fungal infection versus dermatitis.  Lotrisone and Lamisil prescribed, common side effects discussed.  Recommended to try scheduling appointment with dermatologist earlier than January if symptoms persist.  All questions answered.  - terbinafine (LAMISIL) 250 MG tablet; Take 1 tablet (250 mg) by mouth daily for 14 days  - clotrimazole-betamethasone (LOTRISONE) 1-0.05 % external cream; Apply topically 2 times daily for 14 days      Kris Godoy MD  [facility]    Alem Bourne is a 72 year old, presenting for the following health issues:  Rash      History of Present Illness       Reason for visit:  Rash, bilateral legs  Symptom onset:  More than a month  Symptoms include:  Mildly itchy  Symptom intensity:  Mild  Symptom progression:  Improving  Had these symptoms before:  Yes  Prior treatment description:  Patient has several prescription creams that he tried, but nothing seemed to help    He eats 2-3 servings of fruits and vegetables daily.He consumes 0 sweetened beverage(s) daily.He exercises with enough effort to increase his heart rate 9 or less minutes per day.  He exercises with enough effort to increase his heart rate 3 or less days per week.   He is taking medications regularly.      Review of Systems      Constitutional, HEENT, cardiovascular, pulmonary, gi and gu systems are negative, except as otherwise noted.      Objective    /72 (BP Location: Right arm, Patient Position: Sitting, Cuff Size: Adult Regular)   Pulse 67   Temp 97.8  F (36.6  C) (Tympanic)   Resp 20   Ht 1.695 m (5' 6.75\")   Wt 83.4 kg (183 lb 12.8 oz)   SpO2 97%   BMI 29.00 kg/m    Body mass index is 29 kg/m .  Physical Exam   GENERAL: alert and no distress  NECK: no adenopathy, no asymmetry, masses, or scars and thyroid normal to palpation  RESP: no wheezes  SKIN: Erythematous rash involving " scrotal skin, groin, thighs bilaterally with superficial scales, no vesicles or discharge noted, no fluorescence under wood light  NEURO: Normal strength and tone, mentation intact and speech normal  PSYCH: mentation appears normal, affect normal/bright

## 2022-11-30 ENCOUNTER — MYC MEDICAL ADVICE (OUTPATIENT)
Dept: FAMILY MEDICINE | Facility: CLINIC | Age: 72
End: 2022-11-30

## 2022-12-14 ENCOUNTER — OFFICE VISIT (OUTPATIENT)
Dept: DERMATOLOGY | Facility: CLINIC | Age: 72
End: 2022-12-14
Payer: COMMERCIAL

## 2022-12-14 DIAGNOSIS — L30.8 ASTEATOTIC DERMATITIS: Primary | ICD-10-CM

## 2022-12-14 PROCEDURE — 99203 OFFICE O/P NEW LOW 30 MIN: CPT | Performed by: DERMATOLOGY

## 2022-12-14 RX ORDER — FLUOCINONIDE 0.5 MG/G
CREAM TOPICAL 2 TIMES DAILY
Qty: 120 G | Refills: 6 | Status: SHIPPED | OUTPATIENT
Start: 2022-12-14 | End: 2023-10-26

## 2022-12-14 NOTE — PROGRESS NOTES
Steffen Mitchell , a 72 year old year old male patient, I was asked to see by Dr. Godoy for rash on legs.  Patient states this has been present for months.  Patient reports the following symptoms:  itching .  Patient reports the following previous treatments desonide.  .  Patient reports the following modifying factors none.  Associated symptoms: none. Using ivory soap.  No t using emollients.  .  Patient has no other skin complaints today.  Remainder of the HPI, Meds, PMH, Allergies, FH, and SH was reviewed in chart.      Past Medical History:   Diagnosis Date     Arthritis        Past Surgical History:   Procedure Laterality Date     COLONOSCOPY N/A 5/13/2015    Procedure: COLONOSCOPY;  Surgeon: Carlos Ovalles MD;  Location: [facility]     ORTHOPEDIC SURGERY       SURGICAL HISTORY OF -   6/30/2003     Colonoscopy and polypectomy with snare     SURGICAL HISTORY OF -   8/28/1984     Arthroscoopy Arthroscopic medial meniscectomy Left Knee     SURGICAL HISTORY OF -   3/29/2002    Right Knee,partial medial meniscectomy         Family History   Problem Relation Age of Onset     Hypertension Mother      Cerebrovascular Disease Mother      Hypertension Father      Cerebrovascular Disease Father 70     Diabetes Father      Eye Disorder Father         glacoma     Coronary Artery Disease Father      C.A.D. Brother         bypass surgery at age 55     Cardiovascular Brother         pulmonary hypertention     Respiratory Brother         sleep apnea     Heart Disease Brother         triple bypass       Social History     Socioeconomic History     Marital status:      Spouse name: Not on file     Number of children: Not on file     Years of education: Not on file     Highest education level: Not on file   Occupational History     Not on file   Tobacco Use     Smoking status: Former     Packs/day: 1.00     Years: 10.00     Pack years: 10.00     Types: Cigarettes     Start date: 1/1/1966     Quit date: 1/1/1975     Years  since quittin.9     Smokeless tobacco: Former     Quit date: 1980   Vaping Use     Vaping Use: Never used   Substance and Sexual Activity     Alcohol use: Yes     Comment: occ has wine with dinner     Drug use: No     Sexual activity: Yes     Partners: Female     Birth control/protection: None   Other Topics Concern      Service No     Blood Transfusions No     Caffeine Concern Yes     Comment: 4-5 cups     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 No   Social History Narrative     Not on file     Social Determinants of Health     Financial Resource Strain: Not on file   Food Insecurity: Not on file   Transportation Needs: Not on file   Physical Activity: Not on file   Stress: Not on file   Social Connections: Not on file   Intimate Partner Violence: Not on file   Housing Stability: Not on file       Outpatient Encounter Medications as of 2022   Medication Sig Dispense Refill     ALEVE 220 MG OR TABS 1 TABLET EVERY 12 HOURS AS NEEDED       ASPIRIN 81 MG OR TABS 1 tab po QD (Once per day) 100 3     ciclopirox (LOPROX) 0.77 % cream Apply topically 2 times daily Apply to sites on face at bedtime 90 g 3     clobetasol (TEMOVATE) 0.05 % external solution Apply topically daily Apply to site son scalp daily 100 mL 3     desonide (DESOWEN) 0.05 % external cream Apply sparingly to affected area three times daily as needed. 60 g 1     EPINEPHrine (ADRENACLICK) 0.3 MG/0.3ML injection Inject 0.3 mLs (0.3 mg) into the muscle as needed for anaphylaxis (Patient not taking: Reported on 2022) 0.6 mL 1     ketoconazole (NIZORAL) 2 % external  shampoo Apply to the affected area and wash off after 5 minutes. 240 mL 11     No facility-administered encounter medications on file as of 12/14/2022.             Review Of Systems  Skin: As above  Eyes: negative  Ears/Nose/Throat: negative  Respiratory: No shortness of breath, dyspnea on exertion, cough, or hemoptysis  Cardiovascular: negative  Gastrointestinal: negative  Genitourinary: negative  Musculoskeletal: negative  Neurologic: negative  Psychiatric: negative  Hematologic/Lymphatic/Immunologic: negative  Endocrine: negative      O:   NAD, WDWN, Alert & Oriented, Mood & Affect wnl, Vitals stable   Here today alone General appearance annette ii   Vitals stable   Alert, oriented and in no acute distress   Asteatotic patches on legs      Eyes: Conjunctivae/lids:Normal     ENT: Lips, buccal mucosa, tongue: normal    MSK:Normal    Cardiovascular: peripheral edema none    Pulm: Breathing Normal    Neuro/Psych: Orientation:Normal; Mood/Affect:Normal      A/P:  1. Asteatotic derm   Skin care discussed with patient   Stop ivory soap  Emollient use discussed with patient   Lidex twice daily  Return to clinic 2 months  It was a pleasure speaking to Steffen Mitchell today.  Previous clinic  notes and pertinent laboratory tests were reviewed prior to Steffen Mitchell's visit.  Skin care regimen reviewed with patient: Eliminate harsh soaps, i.e. Dial, zest, irsih spring; Mild soaps such as Cetaphil or Dove sensitive skin, avoid hot or cold showers, aggressive use of emollients including vanicream, cetaphil or cerave discussed with patient.

## 2022-12-14 NOTE — LETTER
12/14/2022         RE: Steffen Mitchell  [address]  [place] [address]        Dear Colleague,    Thank you for referring your patient, Steffen Mitchell, to the [facility]. Please see a copy of my visit note below.    Steffen Mitchell , a 72 year old year old male patient, I was asked to see by Dr. Godoy for rash on legs.  Patient states this has been present for months.  Patient reports the following symptoms:  itching .  Patient reports the following previous treatments desonide.  .  Patient reports the following modifying factors none.  Associated symptoms: none. Using ivory soap.  No t using emollients.  .  Patient has no other skin complaints today.  Remainder of the HPI, Meds, PMH, Allergies, FH, and SH was reviewed in chart.      Past Medical History:   Diagnosis Date     Arthritis        Past Surgical History:   Procedure Laterality Date     COLONOSCOPY N/A 5/13/2015    Procedure: COLONOSCOPY;  Surgeon: Carlos Ovalles MD;  Location: [facility]     ORTHOPEDIC SURGERY       SURGICAL HISTORY OF -   6/30/2003     Colonoscopy and polypectomy with snare     SURGICAL HISTORY OF -   8/28/1984     Arthroscoopy Arthroscopic medial meniscectomy Left Knee     SURGICAL HISTORY OF -   3/29/2002    Right Knee,partial medial meniscectomy         Family History   Problem Relation Age of Onset     Hypertension Mother      Cerebrovascular Disease Mother      Hypertension Father      Cerebrovascular Disease Father 70     Diabetes Father      Eye Disorder Father         glacoma     Coronary Artery Disease Father      C.A.D. Brother         bypass surgery at age 55     Cardiovascular Brother         pulmonary hypertention     Respiratory Brother         sleep apnea     Heart Disease Brother         triple bypass       Social History     Socioeconomic History     Marital status:      Spouse name: Not on file     Number of children: Not on file     Years of education: Not on file     Highest education  level: Not on file   Occupational History     Not on file   Tobacco Use     Smoking status: Former     Packs/day: 1.00     Years: 10.00     Pack years: 10.00     Types: Cigarettes     Start date: 1966     Quit date: 1975     Years since quittin.9     Smokeless tobacco: Former     Quit date: 1980   Vaping Use     Vaping Use: Never used   Substance and Sexual Activity     Alcohol use: Yes     Comment: occ has wine with dinner     Drug use: No     Sexual activity: Yes     Partners: Female     Birth control/protection: None   Other Topics Concern      Service No     Blood Transfusions No     Caffeine Concern Yes     Comment: 4-5 cups     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 No   Social History Narrative     Not on file     Social Determinants of Health     Financial Resource Strain: Not on file   Food Insecurity: Not on file   Transportation Needs: Not on file   Physical Activity: Not on file   Stress: Not on file   Social Connections: Not on file   Intimate Partner Violence: Not on file   Housing Stability: Not on file       Outpatient Encounter Medications as of 2022   Medication Sig Dispense Refill     ALEVE 220 MG OR TABS 1 TABLET EVERY 12 HOURS AS NEEDED       ASPIRIN 81 MG OR TABS 1 tab po QD (Once per day) 100 3     ciclopirox (LOPROX) 0.77 % cream Apply topically 2 times daily Apply to sites on face at bedtime 90 g 3     clobetasol (TEMOVATE) 0.05 % external solution Apply topically daily Apply to site son scalp daily 100 mL 3     desonide (DESOWEN) 0.05 % external cream Apply sparingly to affected area three times  daily as needed. 60 g 1     EPINEPHrine (ADRENACLICK) 0.3 MG/0.3ML injection Inject 0.3 mLs (0.3 mg) into the muscle as needed for anaphylaxis (Patient not taking: Reported on 11/9/2022) 0.6 mL 1     ketoconazole (NIZORAL) 2 % external shampoo Apply to the affected area and wash off after 5 minutes. 240 mL 11     No facility-administered encounter medications on file as of 12/14/2022.             Review Of Systems  Skin: As above  Eyes: negative  Ears/Nose/Throat: negative  Respiratory: No shortness of breath, dyspnea on exertion, cough, or hemoptysis  Cardiovascular: negative  Gastrointestinal: negative  Genitourinary: negative  Musculoskeletal: negative  Neurologic: negative  Psychiatric: negative  Hematologic/Lymphatic/Immunologic: negative  Endocrine: negative      O:   NAD, WDWN, Alert & Oriented, Mood & Affect wnl, Vitals stable   Here today alone General appearance annette ii   Vitals stable   Alert, oriented and in no acute distress   Asteatotic patches on legs      Eyes: Conjunctivae/lids:Normal     ENT: Lips, buccal mucosa, tongue: normal    MSK:Normal    Cardiovascular: peripheral edema none    Pulm: Breathing Normal    Neuro/Psych: Orientation:Normal; Mood/Affect:Normal      A/P:  1. Asteatotic derm   Skin care discussed with patient   Stop ivory soap  Emollient use discussed with patient   Lidex twice daily  Return to clinic 2 months  It was a pleasure speaking to Steffen Mitchell today.  Previous clinic  notes and pertinent laboratory tests were reviewed prior to Steffen Mitchell's visit.  Skin care regimen reviewed with patient: Eliminate harsh soaps, i.e. Dial, zest, irsih spring; Mild soaps such as Cetaphil or Dove sensitive skin, avoid hot or cold showers, aggressive use of emollients including vanicream, cetaphil or cerave discussed with patient.          Again, thank you for allowing me to participate in the care of your patient.        Sincerely,        Khalif Quinonez MD

## 2022-12-14 NOTE — PATIENT INSTRUCTIONS
Proper skin care from Dr. Quinonez- [facility]     Eliminate harsh soaps, i.e. Dial, Zest, Janae Spring;   Use mild soaps such as Cetaphil or Dove Sensitive Skin   Avoid hot or cold showers   After showering, lightly dry off.    Aggressive use of a moisturizer (including Vanicream, Cetaphil, Aquaphor or Cerave)   We recommend using a tub that needs to be scooped out, not a pump. This has more of an oil base. It will hold moisture in your skin much better than a water base moisturizer. The ones recommended are non- pore clogging.       If you have any questions call [phone] and follow the prompts to Dr. Quinonez's office.

## 2023-04-09 ENCOUNTER — HEALTH MAINTENANCE LETTER (OUTPATIENT)
Age: 73
End: 2023-04-09

## 2023-10-25 ASSESSMENT — ENCOUNTER SYMPTOMS
PALPITATIONS: 0
HEMATURIA: 0
CONSTIPATION: 0
EYE PAIN: 0
DIARRHEA: 0
CHILLS: 0
HEMATOCHEZIA: 0
NERVOUS/ANXIOUS: 0
SHORTNESS OF BREATH: 0
JOINT SWELLING: 0
HEARTBURN: 0
NAUSEA: 0
PARESTHESIAS: 0
FREQUENCY: 0
WEAKNESS: 0
ABDOMINAL PAIN: 0
SORE THROAT: 0
HEADACHES: 0
ARTHRALGIAS: 0
COUGH: 0
DYSURIA: 0
MYALGIAS: 0
FEVER: 0
DIZZINESS: 0

## 2023-10-25 ASSESSMENT — ACTIVITIES OF DAILY LIVING (ADL): CURRENT_FUNCTION: NO ASSISTANCE NEEDED

## 2023-10-26 ENCOUNTER — MYC MEDICAL ADVICE (OUTPATIENT)
Dept: FAMILY MEDICINE | Facility: CLINIC | Age: 73
End: 2023-10-26

## 2023-10-26 ENCOUNTER — OFFICE VISIT (OUTPATIENT)
Dept: FAMILY MEDICINE | Facility: CLINIC | Age: 73
End: 2023-10-26
Payer: COMMERCIAL

## 2023-10-26 VITALS
HEART RATE: 61 BPM | HEIGHT: 67 IN | BODY MASS INDEX: 28.88 KG/M2 | TEMPERATURE: 97.6 F | OXYGEN SATURATION: 98 % | WEIGHT: 184 LBS | RESPIRATION RATE: 18 BRPM | DIASTOLIC BLOOD PRESSURE: 82 MMHG | SYSTOLIC BLOOD PRESSURE: 124 MMHG

## 2023-10-26 DIAGNOSIS — R21 RASH AND NONSPECIFIC SKIN ERUPTION: Primary | ICD-10-CM

## 2023-10-26 DIAGNOSIS — E78.2 MIXED HYPERLIPIDEMIA: ICD-10-CM

## 2023-10-26 DIAGNOSIS — Z12.11 SCREEN FOR COLON CANCER: ICD-10-CM

## 2023-10-26 DIAGNOSIS — Z00.00 ENCOUNTER FOR MEDICARE ANNUAL WELLNESS EXAM: Primary | ICD-10-CM

## 2023-10-26 DIAGNOSIS — R41.89 COGNITIVE DECLINE: ICD-10-CM

## 2023-10-26 DIAGNOSIS — L21.9 SEBORRHEIC DERMATITIS: ICD-10-CM

## 2023-10-26 LAB
ANION GAP SERPL CALCULATED.3IONS-SCNC: 12 MMOL/L (ref 7–15)
BUN SERPL-MCNC: 20.5 MG/DL (ref 8–23)
CALCIUM SERPL-MCNC: 9 MG/DL (ref 8.8–10.2)
CHLORIDE SERPL-SCNC: 104 MMOL/L (ref 98–107)
CHOLEST SERPL-MCNC: 217 MG/DL
CREAT SERPL-MCNC: 1.07 MG/DL (ref 0.67–1.17)
DEPRECATED HCO3 PLAS-SCNC: 24 MMOL/L (ref 22–29)
EGFRCR SERPLBLD CKD-EPI 2021: 73 ML/MIN/1.73M2
ERYTHROCYTE [DISTWIDTH] IN BLOOD BY AUTOMATED COUNT: 14 % (ref 10–15)
GLUCOSE SERPL-MCNC: 105 MG/DL (ref 70–99)
HCT VFR BLD AUTO: 44.4 % (ref 40–53)
HDLC SERPL-MCNC: 46 MG/DL
HGB BLD-MCNC: 14.9 G/DL (ref 13.3–17.7)
LDLC SERPL CALC-MCNC: 145 MG/DL
MCH RBC QN AUTO: 29.3 PG (ref 26.5–33)
MCHC RBC AUTO-ENTMCNC: 33.6 G/DL (ref 31.5–36.5)
MCV RBC AUTO: 87 FL (ref 78–100)
NONHDLC SERPL-MCNC: 171 MG/DL
PLATELET # BLD AUTO: 193 10E3/UL (ref 150–450)
POTASSIUM SERPL-SCNC: 4.9 MMOL/L (ref 3.4–5.3)
RBC # BLD AUTO: 5.08 10E6/UL (ref 4.4–5.9)
SODIUM SERPL-SCNC: 140 MMOL/L (ref 135–145)
TRIGL SERPL-MCNC: 130 MG/DL
TSH SERPL DL<=0.005 MIU/L-ACNC: 2.1 UIU/ML (ref 0.3–4.2)
VIT B12 SERPL-MCNC: 279 PG/ML (ref 232–1245)
WBC # BLD AUTO: 3 10E3/UL (ref 4–11)

## 2023-10-26 PROCEDURE — 36415 COLL VENOUS BLD VENIPUNCTURE: CPT | Performed by: FAMILY MEDICINE

## 2023-10-26 PROCEDURE — 82607 VITAMIN B-12: CPT | Performed by: FAMILY MEDICINE

## 2023-10-26 PROCEDURE — 84443 ASSAY THYROID STIM HORMONE: CPT | Performed by: FAMILY MEDICINE

## 2023-10-26 PROCEDURE — G0439 PPPS, SUBSEQ VISIT: HCPCS | Performed by: FAMILY MEDICINE

## 2023-10-26 PROCEDURE — 99214 OFFICE O/P EST MOD 30 MIN: CPT | Mod: 25 | Performed by: FAMILY MEDICINE

## 2023-10-26 PROCEDURE — 80048 BASIC METABOLIC PNL TOTAL CA: CPT | Performed by: FAMILY MEDICINE

## 2023-10-26 PROCEDURE — 85027 COMPLETE CBC AUTOMATED: CPT | Performed by: FAMILY MEDICINE

## 2023-10-26 PROCEDURE — 80061 LIPID PANEL: CPT | Performed by: FAMILY MEDICINE

## 2023-10-26 RX ORDER — CLOTRIMAZOLE 1 %
CREAM (GRAM) TOPICAL 2 TIMES DAILY
Qty: 60 G | Refills: 1 | Status: SHIPPED | OUTPATIENT
Start: 2023-10-26 | End: 2023-11-09

## 2023-10-26 RX ORDER — KETOCONAZOLE 20 MG/ML
SHAMPOO TOPICAL
Qty: 120 ML | Refills: 1 | Status: SHIPPED | OUTPATIENT
Start: 2023-10-26

## 2023-10-26 RX ORDER — ATORVASTATIN CALCIUM 20 MG/1
20 TABLET, FILM COATED ORAL DAILY
Qty: 90 TABLET | Refills: 1 | Status: SHIPPED | OUTPATIENT
Start: 2023-10-26 | End: 2024-03-25

## 2023-10-26 ASSESSMENT — ENCOUNTER SYMPTOMS
CONSTIPATION: 0
FEVER: 0
WEAKNESS: 0
CHILLS: 0
HEMATOCHEZIA: 0
HEARTBURN: 0
DYSURIA: 0
ABDOMINAL PAIN: 0
NERVOUS/ANXIOUS: 0
PALPITATIONS: 0
SORE THROAT: 0
NAUSEA: 0
ARTHRALGIAS: 0
DIARRHEA: 0
PARESTHESIAS: 0
FREQUENCY: 0
HEADACHES: 0
HEMATURIA: 0
SHORTNESS OF BREATH: 0
COUGH: 0
DIZZINESS: 0
EYE PAIN: 0
MYALGIAS: 0
JOINT SWELLING: 0

## 2023-10-26 ASSESSMENT — ACTIVITIES OF DAILY LIVING (ADL): CURRENT_FUNCTION: NO ASSISTANCE NEEDED

## 2023-10-26 ASSESSMENT — PAIN SCALES - GENERAL: PAINLEVEL: NO PAIN (0)

## 2023-10-26 NOTE — PROGRESS NOTES
"SUBJECTIVE:   Steffen is a 73 year old who presents for Preventive Visit.    Are you in the first 12 months of your Medicare coverage?  No    Healthy Habits:     In general, how would you rate your overall health?  Excellent    Frequency of exercise:  1 day/week    Duration of exercise:  Less than 15 minutes    Do you usually eat at least 4 servings of fruit and vegetables a day, include whole grains    & fiber and avoid regularly eating high fat or \"junk\" foods?  Yes    Taking medications regularly:  Yes    Medication side effects:  None    Ability to successfully perform activities of daily living:  No assistance needed    Home Safety:  Lack of grab bars in the bathroom    Hearing Impairment:  Find that men's voices are easier to understand than woman's and difficulty understanding soft or whispered speech    In the past 6 months, have you been bothered by leaking of urine?  No    In general, how would you rate your overall mental or emotional health?  Excellent    Additional concerns today:  Yes      Today's PHQ-2 Score:       10/25/2023     8:08 PM   PHQ-2 ( 1999 [employer])   Q1: Little interest or pleasure in doing things 0   Q2: Feeling down, depressed or hopeless 0   PHQ-2 Score 0   Q1: Little interest or pleasure in doing things Not at all   Q2: Feeling down, depressed or hopeless Not at all   PHQ-2 Score 0           Have you ever done Advance Care Planning? (For example, a Health Directive, POLST, or a discussion with a medical provider or your loved ones about your wishes): No, advance care planning information given to patient to review.  Patient plans to discuss their wishes with loved ones or provider.         Fall risk  Fallen 2 or more times in the past year?: No  Any fall with injury in the past year?: No    Cognitive Screening   1) Repeat 3 items (Leader, Season, Table)    2) Clock draw: NORMAL  3) 3 item recall: Recalls 3 objects  Results: 3 items recalled: COGNITIVE IMPAIRMENT LESS LIKELY    Mini-CogTM " Copyright SUELLEN Aguayo. Licensed by the author for use in [facility]; reprinted with permission (olman@[facility]). All rights reserved.          Reviewed and updated as needed this visit by clinical staff   Tobacco  Allergies  Meds              Reviewed and updated as needed this visit by Provider                 Social History     Tobacco Use     Smoking status: Former     Packs/day: 1.00     Years: 10.00     Additional pack years: 0.00     Total pack years: 10.00     Types: Cigarettes     Start date: 1966     Quit date: 1975     Years since quittin.8     Passive exposure: Past     Smokeless tobacco: Former     Quit date: 1980   Substance Use Topics     Alcohol use: Yes     Comment: occ has wine with dinner             10/25/2023     8:07 PM   Alcohol Use   Prescreen: >3 drinks/day or >7 drinks/week? No     Do you have a current opioid prescription? No  Do you use any other controlled substances or medications that are not prescribed by a provider? None      PROBLEMS TO ADD ON...  -Has been told that his memory is getting worse       Current providers sharing in care for this patient include:   Patient Care Team:  Clinic - [facility] as PCP - General  Khalif Quinonez MD as MD (Dermatology)  Khalif Quinonez MD as Assigned Surgical Provider  Kris Godoy MD as Assigned PCP    The following health maintenance items are reviewed in Epic and correct as of today:  Health Maintenance   Topic Date Due     DTAP/TDAP/TD IMMUNIZATION (1 - Tdap) 1999     RSV VACCINE 60+ (1 - 1-dose 60+ series) Never done     COLORECTAL CANCER SCREENING  2020     INFLUENZA VACCINE (1) 2023     ANNUAL REVIEW OF HM ORDERS  2023     LIPID  2024     MEDICARE ANNUAL WELLNESS VISIT  10/26/2024     FALL RISK ASSESSMENT  10/26/2024     ADVANCE CARE PLANNING  10/26/2028     HEPATITIS C SCREENING  Completed     PHQ-2 (once per calendar year)  Completed      Pneumococcal Vaccine: 65+ Years  Completed     ZOSTER IMMUNIZATION  Completed     AORTIC ANEURYSM SCREENING (SYSTEM ASSIGNED)  Completed     COVID-19 Vaccine  Completed     IPV IMMUNIZATION  Aged Out     HPV IMMUNIZATION  Aged Out     MENINGITIS IMMUNIZATION  Aged Out     Lab work is in process  Labs reviewed in EPIC  BP Readings from Last 3 Encounters:   10/26/23 124/82   22 120/72   02/10/22 138/84    Wt Readings from Last 3 Encounters:   10/26/23 83.5 kg (184 lb)   22 83.4 kg (183 lb 12.8 oz)   02/10/22 81.5 kg (179 lb 9.6 oz)                  Patient Active Problem List   Diagnosis     ALLERGY   **  SEE ALSO ALLERGIC REAC BEE STING     Sensorineural hearing loss     Subjective tinnitus     CARDIOVASCULAR SCREENING; LDL GOAL LESS THAN 160     Advanced directives, counseling/discussion     Screening for prostate cancer     Past Surgical History:   Procedure Laterality Date     COLONOSCOPY N/A 2015    Procedure: COLONOSCOPY;  Surgeon: Carlos Ovalles MD;  Location: [facility]     ORTHOPEDIC SURGERY       SURGICAL HISTORY OF -   2003     Colonoscopy and polypectomy with snare     SURGICAL HISTORY OF -   1984     Arthroscoopy Arthroscopic medial meniscectomy Left Knee     SURGICAL HISTORY OF -   3/29/2002    Right Knee,partial medial meniscectomy        Social History     Tobacco Use     Smoking status: Former     Packs/day: 1.00     Years: 10.00     Additional pack years: 0.00     Total pack years: 10.00     Types: Cigarettes     Start date: 1966     Quit date: 1975     Years since quittin.8     Passive exposure: Past     Smokeless tobacco: Former     Quit date: 1980   Substance Use Topics     Alcohol use: Yes     Comment: occ has wine with dinner     Family History   Problem Relation Age of Onset     Hypertension Mother      Cerebrovascular Disease Mother      Hypertension Father      Cerebrovascular Disease Father 70     Diabetes Father      Eye Disorder Father          jovanni     Coronary Artery Disease Father      [email or web address]. Brother         bypass surgery at age 55     Cardiovascular Brother         pulmonary hypertention     Respiratory Brother         sleep apnea     Heart Disease Brother         triple bypass         Current Outpatient Medications   Medication Sig Dispense Refill     ASPIRIN 81 MG OR TABS 1 tab po QD (Once per day) 100 3     ALEVE 220 MG OR TABS 1 TABLET EVERY 12 HOURS AS NEEDED (Patient not taking: Reported on 10/26/2023)       ciclopirox (LOPROX) 0.77 % cream Apply topically 2 times daily Apply to sites on face at bedtime (Patient not taking: Reported on 10/26/2023) 90 g 3     clobetasol (TEMOVATE) 0.05 % external solution Apply topically daily Apply to site son scalp daily (Patient not taking: Reported on 10/26/2023) 100 mL 3     desonide (DESOWEN) 0.05 % external cream Apply sparingly to affected area three times daily as needed. (Patient not taking: Reported on 10/26/2023) 60 g 1     EPINEPHrine (ADRENACLICK) 0.3 MG/0.3ML injection Inject 0.3 mLs (0.3 mg) into the muscle as needed for anaphylaxis (Patient not taking: Reported on 11/9/2022) 0.6 mL 1     fluocinonide (LIDEX) 0.05 % external cream Apply topically 2 times daily (Patient not taking: Reported on 10/26/2023) 120 g 6     ketoconazole (NIZORAL) 2 % external shampoo Apply to the affected area and wash off after 5 minutes. (Patient not taking: Reported on 10/26/2023) 240 mL 11     Allergies   Allergen Reactions     Bees      Bee Stings       Recent Labs   Lab Test 09/09/21  1437 09/05/21  2141 02/12/19  0933 03/30/17  0954   LDL  --   --  116*  --    HDL  --   --  41  --    TRIG  --   --  206*  --    ALT 33 32 33 21   CR 1.14 1.25 1.01 0.96   GFRESTIMATED 64 58* 76 78   GFRESTBLACK  --   --  88 >90   GFR Calc     POTASSIUM 4.6 4.3 4.6 4.4          Review of Systems   Constitutional:  Negative for chills and fever.   HENT:  Negative for congestion, ear pain, hearing loss and sore  "throat.    Eyes:  Negative for pain and visual disturbance.   Respiratory:  Negative for cough and shortness of breath.    Cardiovascular:  Negative for chest pain, palpitations and peripheral edema.   Gastrointestinal:  Negative for abdominal pain, constipation, diarrhea, heartburn, hematochezia and nausea.   Genitourinary:  Negative for dysuria, frequency, genital sores, hematuria, impotence, penile discharge and urgency.   Musculoskeletal:  Negative for arthralgias, joint swelling and myalgias.   Skin:  Negative for rash.   Neurological:  Negative for dizziness, weakness, headaches and paresthesias.   Psychiatric/Behavioral:  Negative for mood changes. The patient is not nervous/anxious.         Memory change         OBJECTIVE:   /82 (Cuff Size: Adult Regular)   Pulse 61   Temp 97.6  F (36.4  C) (Tympanic)   Resp 18   Ht 1.695 m (5' 6.75\")   Wt 83.5 kg (184 lb)   SpO2 98%   BMI 29.03 kg/m   Estimated body mass index is 29.03 kg/m  as calculated from the following:    Height as of this encounter: 1.695 m (5' 6.75\").    Weight as of this encounter: 83.5 kg (184 lb).  Physical Exam  GENERAL: alert and no distress  EYES: Eyes grossly normal to inspection, PERRL and conjunctivae and sclerae normal  HENT: normal cephalic/atraumatic, nose and mouth without ulcers or lesions, oropharynx clear, and oral mucous membranes moist  NECK: no adenopathy, no asymmetry, masses, or scars and thyroid normal to palpation  RESP: lungs clear to auscultation - no rales, rhonchi or wheezes  CV: regular rates and rhythm, normal S1 S2, no S3 or S4, and no murmur, click or rub  ABDOMEN: soft, nontender  MS: whitish scales involving frontal scalp skin without any underlying  SKIN: no suspicious lesions or rashes  NEURO: Normal strength and tone, mentation intact and speech normal  PSYCH: mentation appears normal, affect normal/bright    ASSESSMENT / PLAN:   (Z00.00) Encounter for Medicare annual wellness exam  (primary encounter " "diagnosis)  Comment: Complaints of having cognitive decline for last several months.  Differentials discussed in detail.  Routine labs ordered and neuropsych, neurology referral placed.  Recommended regular exercise, healthy diet.  Patient deferred influenza vaccine.  Follow-up in 3 months or earlier if needed.  Plan: Glucose, Lipid panel            (Z12.11) Screen for colon cancer  Comment: Screening colonoscopy ordered  Plan: Colonoscopy Screening  Referral            (L21.9) Seborrheic dermatitis  Comment: Involving frontal scalp.  Ketoconazole shampoo prescribed  Plan: ketoconazole (NIZORAL) 2 % external shampoo            (R41.89) Cognitive decline  Comment: As above  Plan: CBC with platelets, Basic metabolic panel  (Ca,        Cl, CO2, Creat, Gluc, K, Na, BUN), TSH with         free T4 reflex, Vitamin B12, Adult         Neuropsychology  Referral, Adult         Neurology  Referral           COUNSELING:  Reviewed preventive health counseling, as reflected in patient instructions      BMI:   Estimated body mass index is 29.03 kg/m  as calculated from the following:    Height as of this encounter: 1.695 m (5' 6.75\").    Weight as of this encounter: 83.5 kg (184 lb).   Weight management plan: Discussed healthy diet and exercise guidelines      He reports that he quit smoking about 48 years ago. His smoking use included cigarettes. He started smoking about 57 years ago. He has a 10.00 pack-year smoking history. He has been exposed to tobacco smoke. He quit smokeless tobacco use about 43 years ago.      Appropriate preventive services were discussed with this patient, including applicable screening as appropriate for fall prevention, nutrition, physical activity, Tobacco-use cessation, weight loss and cognition.  Checklist reviewing preventive services available has been given to the patient.    Reviewed patients plan of care and provided an AVS. The Basic Care Plan (routine screening as " documented in Health Maintenance) for Steffen meets the Care Plan requirement. This Care Plan has been established and reviewed with the Patient.        Kris Godoy MD  [facility]    Identified Health Risks:    He is at risk for lack of exercise and has been provided with information to increase physical activity for the benefit of his well-being.  The patient was provided with written information regarding signs of hearing loss.

## 2023-10-26 NOTE — NURSING NOTE
"Chief Complaint   Patient presents with    Physical     /82 (Cuff Size: Adult Regular)   Pulse 61   Temp 97.6  F (36.4  C) (Tympanic)   Resp 18   Ht 1.695 m (5' 6.75\")   Wt 83.5 kg (184 lb)   SpO2 98%   BMI 29.03 kg/m   Estimated body mass index is 29.03 kg/m  as calculated from the following:    Height as of this encounter: 1.695 m (5' 6.75\").    Weight as of this encounter: 83.5 kg (184 lb).  Patient presents to the clinic using No DME      Health Maintenance that is potentially due pending provider review:    Health Maintenance Due   Topic Date Due    DTAP/TDAP/TD IMMUNIZATION (1 - Tdap) 01/02/1999    RSV VACCINE 60+ (1 - 1-dose 60+ series) Never done    COLORECTAL CANCER SCREENING  05/13/2020    INFLUENZA VACCINE (1) 09/01/2023    ANNUAL REVIEW OF HM ORDERS  11/09/2023                  "

## 2023-10-26 NOTE — LETTER
November 1, 2023      Steffen Mitchell  [address]  [place] [address]        Dear ,    We are writing to inform you of your test results.    Vitamin B12 level came back normal.     Let us know if there are any questions.       Resulted Orders   CBC with platelets   Result Value Ref Range    WBC Count 3.0 (L) 4.0 - 11.0 10e3/uL    RBC Count 5.08 4.40 - 5.90 10e6/uL    Hemoglobin 14.9 13.3 - 17.7 g/dL    Hematocrit 44.4 40.0 - 53.0 %    MCV 87 78 - 100 fL    MCH 29.3 26.5 - 33.0 pg    MCHC 33.6 31.5 - 36.5 g/dL    RDW 14.0 10.0 - 15.0 %    Platelet Count 193 150 - 450 10e3/uL   Basic metabolic panel  (Ca, Cl, CO2, Creat, Gluc, K, Na, BUN)   Result Value Ref Range    Sodium 140 135 - 145 mmol/L      Comment:      Reference intervals for this test were updated on 09/26/2023 to more accurately reflect our healthy population. There may be differences in the flagging of prior results with similar values performed with this method. Interpretation of those prior results can be made in the context of the updated reference intervals.     Potassium 4.9 3.4 - 5.3 mmol/L    Chloride 104 98 - 107 mmol/L    Carbon Dioxide (CO2) 24 22 - 29 mmol/L    Anion Gap 12 7 - 15 mmol/L    Urea Nitrogen 20.5 8.0 - 23.0 mg/dL    Creatinine 1.07 0.67 - 1.17 mg/dL    GFR Estimate 73 >60 mL/min/1.73m2    Calcium 9.0 8.8 - 10.2 mg/dL    Glucose 105 (H) 70 - 99 mg/dL   TSH with free T4 reflex   Result Value Ref Range    TSH 2.10 0.30 - 4.20 uIU/mL   Vitamin B12   Result Value Ref Range    Vitamin B12 279 232 - 1,245 pg/mL       If you have any questions or concerns, please call the clinic at the number listed above.       Sincerely,      Kris Godoy MD

## 2023-10-26 NOTE — PATIENT INSTRUCTIONS
"Patient Education   Personalized Prevention Plan  You are due for the preventive services outlined below.  Your care team is available to assist you in scheduling these services.  If you have already completed any of these items, please share that information with your care team to update in your medical record.  Health Maintenance Due   Topic Date Due     Diptheria Tetanus Pertussis (DTAP/TDAP/TD) Vaccine (1 - Tdap) 01/02/1999     RSV VACCINE 60+ (1 - 1-dose 60+ series) Never done     Colorectal Cancer Screening  05/13/2020     Flu Vaccine (1) 09/01/2023     ANNUAL REVIEW OF HM ORDERS  11/09/2023     Learning About Being Physically Active  What is physical activity?     Being physically active means doing any kind of activity that gets your body moving.  The types of physical activity that can help you get fit and stay healthy include:  Aerobic or \"cardio\" activities. These make your heart beat faster and make you breathe harder, such as brisk walking, riding a bike, or running. They strengthen your heart and lungs and build up your endurance.  Strength training activities. These make your muscles work against, or \"resist,\" something. Examples include lifting weights or doing push-ups. These activities help tone and strengthen your muscles and bones.  Stretches. These let you move your joints and muscles through their full range of motion. Stretching helps you be more flexible.  Reaching a balance between these three types of physical activity is important because each one contributes to your overall fitness.  What are the benefits of being active?  Being active is one of the best things you can do for your health. It helps you to:  Feel stronger and have more energy to do all the things you like to do.  Focus better at school or work.  Feel, think, and sleep better.  Reach and stay at a healthy weight.  Lose fat and build lean muscle.  Lower your risk for serious health problems, including diabetes, heart attack, " "high blood pressure, and some cancers.  Keep your heart, lungs, bones, muscles, and joints strong and healthy.  How can you make being active part of your life?  Start slowly. Make it your long-term goal to get at least 30 minutes of exercise on most days of the week. Walking is a good choice. You also may want to do other activities, such as running, swimming, cycling, or playing tennis or team sports.  Pick activities that you like--ones that make your heart beat faster, your muscles stronger, and your muscles and joints more flexible. If you find more than one thing you like doing, do them all. You don't have to do the same thing every day.  Get your heart pumping every day. Any activity that makes your heart beat faster and keeps it at that rate for a while counts.  Here are some great ways to get your heart beating faster:  Go for a brisk walk, run, or bike ride.  Go for a hike or swim.  Go in-line skating.  Play a game of touch football, basketball, or soccer.  Ride a bike.  Play tennis or racquetball.  Climb stairs.  Even some household chores can be aerobic--just do them at a faster pace. Vacuuming, raking or mowing the lawn, sweeping the garage, and washing and waxing the car all can help get your heart rate up.  Strengthen your muscles during the week. You don't have to lift heavy weights or grow big, bulky muscles to get stronger. Doing a few simple activities that make your muscles work against, or \"resist,\" something can help you get stronger.  For example, you can:  Do push-ups or sit-ups, which use your own body weight as resistance.  Lift weights or dumbbells or use stretch bands at home or in a gym or community center.  Stretch your muscles often. Stretching will help you as you become more active. It can help you stay flexible, loosen tight muscles, and avoid injury. It can also help improve your balance and posture and can be a great way to relax.  Be sure to stretch the muscles you'll be using " "when you work out. It's best to warm your muscles slightly before you stretch them. Walk or do some other light aerobic activity for a few minutes, and then start stretching.  When you stretch your muscles:  Do it slowly. Stretching is not about going fast or making sudden movements.  Don't push or bounce during a stretch.  Hold each stretch for at least 15 to 30 seconds, if you can. You should feel a stretch in the muscle, but not pain.  Breathe out as you do the stretch. Then breathe in as you hold the stretch. Don't hold your breath.  If you're worried about how more activity might affect your health, have a checkup before you start. Follow any special advice your doctor gives you for getting a smart start.  Where can you learn more?  Go to https://www.[employer].net/patiented  Enter W332 in the search box to learn more about \"Learning About Being Physically Active.\"  Current as of: October 10, 2022               Content Version: 13.7    7025-4566 [email or web address].   Care instructions adapted under license by your healthcare professional. If you have questions about a medical condition or this instruction, always ask your healthcare professional. [email or web address] disclaims any warranty or liability for your use of this information.      Hearing Loss: Care Instructions  Overview     Hearing loss is a sudden or slow decrease in how well you hear. It can range from slight to profound. Permanent hearing loss can occur with aging. It also can happen when you are exposed long-term to loud noise. Examples include listening to loud music, riding motorcycles, or being around other loud machines.  Hearing loss can affect your work and home life. It can make you feel lonely or depressed. You may feel that you have lost your independence. But hearing aids and other devices can help you hear better and feel connected to others.  Follow-up care is a key part of your treatment and safety. Be sure to make and go " to all appointments, and call your doctor if you are having problems. It's also a good idea to know your test results and keep a list of the medicines you take.  How can you care for yourself at home?  Avoid loud noises whenever possible. This helps keep your hearing from getting worse.  Always wear hearing protection around loud noises.  Wear a hearing aid as directed.  A professional can help you pick a hearing aid that will work best for you.  You can also get hearing aids over the counter for mild to moderate hearing loss.  Have hearing tests as your doctor suggests. They can show whether your hearing has changed. Your hearing aid may need to be adjusted.  Use other devices as needed. These may include:  Telephone amplifiers and hearing aids that can connect to a television, stereo, radio, or microphone.  Devices that use lights or vibrations. These alert you to the doorbell, a ringing telephone, or a baby monitor.  Television closed-captioning. This shows the words at the bottom of the screen. Most new TVs can do this.  TTY (text telephone). This lets you type messages back and forth on the telephone instead of talking or listening. These devices are also called TDD. When messages are typed on the keyboard, they are sent over the phone line to a receiving TTY. The message is shown on a monitor.  Use text messaging, social media, and email if it is hard for you to communicate by telephone.  Try to learn a listening technique called speechreading. It is not lipreading. You pay attention to people's gestures, expressions, posture, and tone of voice. These clues can help you understand what a person is saying. Face the person you are talking to, and have them face you. Make sure the lighting is good. You need to see the other person's face clearly.  Think about counseling if you need help to adjust to your hearing loss.  When should you call for help?  Watch closely for changes in your health, and be sure to  "contact your doctor if:    You think your hearing is getting worse.     You have new symptoms, such as dizziness or nausea.   Where can you learn more?  Go to https://www.[employer]elt[employer].net/patiented  Enter R798 in the search box to learn more about \"Hearing Loss: Care Instructions.\"  Current as of: March 1, 2023               Content Version: 13.7    3071-6944 [employer].   Care instructions adapted under license by your healthcare professional. If you have questions about a medical condition or this instruction, always ask your healthcare professional. [employer] disclaims any warranty or liability for your use of this information.         "

## 2023-12-21 NOTE — TELEPHONE ENCOUNTER
RECORDS RECEIVED FROM: Internal    REASON FOR VISIT: Memory loss   Date of Appt: 3/11/24 @ 2:30 pm    NOTES (FOR ALL VISITS) STATUS DETAILS   OFFICE NOTE from referring provider Internal 10/26/23 Kris Godoy MD @[facility]     MEDICATION LIST Internal    IMAGING  (FOR ALL VISITS)     MRI (HEAD, NECK, SPINE) N/A    CT (HEAD, NECK, SPINE) N/A

## 2024-01-04 ENCOUNTER — OFFICE VISIT (OUTPATIENT)
Dept: FAMILY MEDICINE | Facility: CLINIC | Age: 74
End: 2024-01-04
Payer: COMMERCIAL

## 2024-01-04 VITALS
HEART RATE: 65 BPM | HEIGHT: 67 IN | TEMPERATURE: 98 F | DIASTOLIC BLOOD PRESSURE: 78 MMHG | SYSTOLIC BLOOD PRESSURE: 127 MMHG | OXYGEN SATURATION: 97 % | WEIGHT: 186.8 LBS | BODY MASS INDEX: 29.32 KG/M2 | RESPIRATION RATE: 18 BRPM

## 2024-01-04 DIAGNOSIS — L98.9 SKIN LESION: Primary | ICD-10-CM

## 2024-01-04 DIAGNOSIS — L98.9 FOOT LESION: ICD-10-CM

## 2024-01-04 PROCEDURE — 99213 OFFICE O/P EST LOW 20 MIN: CPT | Mod: 25 | Performed by: FAMILY MEDICINE

## 2024-01-04 PROCEDURE — 17110 DESTRUCTION B9 LES UP TO 14: CPT | Performed by: FAMILY MEDICINE

## 2024-01-04 ASSESSMENT — PAIN SCALES - GENERAL: PAINLEVEL: NO PAIN (0)

## 2024-01-04 NOTE — PROGRESS NOTES
Assessment & Plan     Skin lesion  Differential discussed in detail and suspect right thumb lesion benign in etiology.  Cryotherapy performed and recommended to follow-up with dermatology if lesion persists, referral placed  - DESTRUCT BENIGN LESION, UP TO 14  - Orthopedic  Referral; Future  - Adult Dermatology  Referral; Future    Foot lesion  Involving left great toe.  Differential discussed in detail and suspect lump related to ganglion cyst.  Podiatry referral placed.    Cryotherapy Procedure Note      Pre-operative Diagnosis: lesion      Post-operative Diagnosis: lesion      Locations: Right thumb        Indications: Therapeutic        Procedure Details   History of allergy to iodine: No.  Pacemaker? No.      Patient informed of risks (permanent scarring, infection, light or dark discoloration, bleeding, infection, weakness, numbness and recurrence of the lesion) and benefits of the procedure and verbal informed consent obtained.      The areas are treated with liquid nitrogen therapy, frozen until ice ball extended 3 mm beyond lesion, allowed to thaw, and treated again. The patient tolerated procedure well. The patient was instructed on post-op care, warned that there may be blister formation, redness and pain. Recommend OTC analgesia as needed for pain.      Condition:  Stable      Complications:  none.      Plan:  1. Instructed to keep the area dry and covered for 24-48h and clean thereafter.  2. Warning signs of infection were reviewed.    3. Recommended that the patient use OTC acetaminophen as needed for pain.       Kris Godoy MD  [facility]    Alem Bourne is a 73 year old, presenting for the following health issues:  Wart      History of Present Illness       Reason for visit:  Wart  Symptom onset:  More than a month  Symptoms include:  Pain  Symptom intensity:  Mild  Symptom progression:  Staying the same  Had these symptoms before:  Yes  Has  "tried/received treatment for these symptoms:  Yes  What makes it worse:  No  What makes it better:  No    He eats 2-3 servings of fruits and vegetables daily.He consumes 1 sweetened beverage(s) daily.He exercises with enough effort to increase his heart rate 10 to 19 minutes per day.  He exercises with enough effort to increase his heart rate 4 days per week.   He is taking medications regularly.  Also noticing lump involving left great toe for about a month or so, nontender     Review of Systems   Constitutional, HEENT, cardiovascular, pulmonary, gi and gu systems are negative, except as otherwise noted.      Objective    /78   Pulse 65   Temp 98  F (36.7  C)   Resp 18   Ht 1.695 m (5' 6.75\")   Wt 84.7 kg (186 lb 12.8 oz)   SpO2 97%   BMI 29.48 kg/m    Body mass index is 29.48 kg/m .  Physical Exam   GENERAL: alert and no distress  RESP: no wheezes  SKIN: small skin colored lesion involving right thumb as shown below, soft localized cystic swelling involving left great toe, no warmth or tenderness noted  PSYCH: mentation appears normal, affect normal/bright                  "

## 2024-01-05 ENCOUNTER — TELEPHONE (OUTPATIENT)
Dept: ORTHOPEDICS | Facility: CLINIC | Age: 74
End: 2024-01-05
Payer: COMMERCIAL

## 2024-01-08 ENCOUNTER — OFFICE VISIT (OUTPATIENT)
Dept: ORTHOPEDICS | Facility: CLINIC | Age: 74
End: 2024-01-08
Payer: COMMERCIAL

## 2024-01-08 ENCOUNTER — OFFICE VISIT (OUTPATIENT)
Dept: PODIATRY | Facility: CLINIC | Age: 74
End: 2024-01-08
Payer: COMMERCIAL

## 2024-01-08 VITALS
DIASTOLIC BLOOD PRESSURE: 79 MMHG | HEIGHT: 67 IN | SYSTOLIC BLOOD PRESSURE: 122 MMHG | HEART RATE: 65 BPM | BODY MASS INDEX: 29.19 KG/M2 | WEIGHT: 186 LBS

## 2024-01-08 DIAGNOSIS — M67.472 GANGLION CYST OF LEFT FOOT: Primary | ICD-10-CM

## 2024-01-08 DIAGNOSIS — L98.9 SKIN LESION: ICD-10-CM

## 2024-01-08 PROCEDURE — 20604 DRAIN/INJ JOINT/BURSA W/US: CPT | Mod: TA | Performed by: FAMILY MEDICINE

## 2024-01-08 PROCEDURE — 99203 OFFICE O/P NEW LOW 30 MIN: CPT | Performed by: PODIATRIST

## 2024-01-08 RX ADMIN — ROPIVACAINE HYDROCHLORIDE 0.5 ML: 5 INJECTION, SOLUTION EPIDURAL; INFILTRATION; PERINEURAL at 10:37

## 2024-01-08 RX ADMIN — BETAMETHASONE SODIUM PHOSPHATE AND BETAMETHASONE ACETATE 0.5 ML: 3; 3 INJECTION, SUSPENSION INTRA-ARTICULAR; INTRALESIONAL; INTRAMUSCULAR; SOFT TISSUE at 10:37

## 2024-01-08 NOTE — LETTER
"    1/8/2024         RE: Steffen Mitchell  [address]  [place] [address]        Dear Colleague,    Thank you for referring your patient, Steffen Mitchell, to the [facility]. Please see a copy of my visit note below.    PATIENT HISTORY:  Steffen Mitchell is a 73 year old male who presents to clinic in consultation at the request of  Kris Godoy M.D. with a chief complaint of bump on toe of left foot.  The patient is seen by themselves.  The patient relates the pain is primarily located around the great left toe.  Reports insidious onset without acute precipitating event.  The patient relates that the symptoms have been going on for several week(s).  The patient has previously tried nothing with little relief.  The patient is retired.  Any previous notes and studies that pertain to the patient's condition were reviewed.    Pertinent medical, surgical and family history was reviewed in the [facility] chart.    Past Medical History:   Past Medical History:   Diagnosis Date     Arthritis        Medications:   Current Outpatient Medications:      ASPIRIN 81 MG OR TABS, 1 tab po QD (Once per day), Disp: 100, Rfl: 3     atorvastatin (LIPITOR) 20 MG tablet, Take 1 tablet (20 mg) by mouth daily, Disp: 90 tablet, Rfl: 1     EPINEPHrine (ADRENACLICK) 0.3 MG/0.3ML injection, Inject 0.3 mLs (0.3 mg) into the muscle as needed for anaphylaxis, Disp: 0.6 mL, Rfl: 1     ketoconazole (NIZORAL) 2 % external shampoo, Apply 5 to 10 mL to wet scalp, lather, leave on 3 to 5 minutes, and rinse; apply twice weekly for 2 to 4 weeks., Disp: 120 mL, Rfl: 1     Allergies:    Allergies   Allergen Reactions     Wasp Venom Protein Anaphylaxis     Bee Stings     Bees      Bee Stings         Vitals: /79   Pulse 65   Ht 1.695 m (5' 6.75\")   Wt 84.4 kg (186 lb)   BMI 29.35 kg/m    BMI= Body mass index is 29.35 kg/m .    LOWER EXTREMITY PHYSICAL EXAM    Dermatologic: Skin is intact to left lower extremity without " significant lesions, rash or abrasion.        Vascular: DP & PT pulses are intact & regular on the left.   CFT and skin temperature is normal to the left lower extremity.     Neurologic: Lower extremity sensation is intact to light touch.  No evidence of weakness in the left lower extremity.        Musculoskeletal: Patient is ambulatory without assistive device or brace.  No gross ankle deformity noted.  No foot or ankle joint effusion is noted.  Noted moderate soft tissue mass over the left great toe.  No surrounding erythema or edema noted.  No pain with range of motion of the first metatarsophalangeal joint on the left.           ASSESSMENT / PLAN:     ICD-10-CM    1. Ganglion cyst of left foot  M67.472 Orthopedic  Referral     CANCELED: Orthopedic  Referral      2. Skin lesion  L98.9 Orthopedic  Referral          I have explained to Steffen about the conditions.  We discussed the underlying contributing factors to the condition as well as both conservative and surgical treatment options along with expected length of recovery.  At this time, The patient was referred to [facility] for an ultrasound guided aspiration and steroid injection of the ganglion cyst on the left great toe.      Steffen verbalized agreement with and understanding of the rational for the diagnosis and treatment plan.  All questions were answered to best of my ability and the patient's satisfaction. The patient was advised to contact the clinic with any questions that may arise after the clinic visit.      Disclaimer: This note consists of symbols derived from keyboarding, dictation and/or voice recognition software. As a result, there may be errors in the script that have gone undetected. Please consider this when interpreting information found in this chart.       KAIDEN Archuleta.PEMELIA., F.MANAV.C.F.A.S.      Again, thank you for allowing me to participate in the care of your patient.         Sincerely,        Carlos Miranda DPM

## 2024-01-08 NOTE — NURSING NOTE
"Chief Complaint   Patient presents with    Consult     Bump on left foot great toe       Initial /79   Pulse 65   Ht 1.695 m (5' 6.75\")   Wt 84.4 kg (186 lb)   BMI 29.35 kg/m   Estimated body mass index is 29.35 kg/m  as calculated from the following:    Height as of this encounter: 1.695 m (5' 6.75\").    Weight as of this encounter: 84.4 kg (186 lb).  Medications and allergies reviewed.      Suri RICHEY MA    "

## 2024-01-08 NOTE — LETTER
1/8/2024         RE: Steffen Mitchell  [address]  [place] [address]        Dear Colleague,    Thank you for referring your patient, Steffen Mitchell, to the [facility] [place]. Please see a copy of my visit note below.    Small Joint Injection/Arthrocentesis: L great MTP    Date/Time: 1/8/2024 10:37 AM    Performed by: Tc Mock MD  Authorized by: Tc Mock MD  Indications:  Pain and diagnostic evaluation  Needle Size:  18 G  Guidance: ultrasound     Approach:  Dorsal  Location:  Great toe    Site:  L great MTP                    Medications:  0.5 mL betamethasone acet & sod phos 6 (3-3) MG/ML; 0.5 mL ROPivacaine 5 MG/ML  Aspirate amount (mL):  3    Aspirate:  Yellow      Outcome:  Tolerated well, no immediate complications  Procedure discussed: discussed risks, benefits, and alternatives      Timeout: timeout called immediately prior to procedure    Prep: patient was prepped and draped in usual sterile fashion       Ultrasound images of procedure were permanently stored.     Patient reported improvement of pain after the numbing portion left 1st MTP joint cyst aspiration/steroid injection.  Ultrasound guided images were permanently stored.  Aftercare instructions given to patient.  Plan to follow-up as previously discussed with referring provider.     Tc Mock MD [facility]              Again, thank you for allowing me to participate in the care of your patient.        Sincerely,        Tc Mock MD

## 2024-01-08 NOTE — PROGRESS NOTES
"PATIENT HISTORY:  Steffen Mitchell is a 73 year old male who presents to clinic in consultation at the request of  Kris Godoy M.D. with a chief complaint of bump on toe of left foot.  The patient is seen by themselves.  The patient relates the pain is primarily located around the great left toe.  Reports insidious onset without acute precipitating event.  The patient relates that the symptoms have been going on for several week(s).  The patient has previously tried nothing with little relief.  The patient is retired.  Any previous notes and studies that pertain to the patient's condition were reviewed.    Pertinent medical, surgical and family history was reviewed in the [facility] chart.    Past Medical History:   Past Medical History:   Diagnosis Date    Arthritis        Medications:   Current Outpatient Medications:     ASPIRIN 81 MG OR TABS, 1 tab po QD (Once per day), Disp: 100, Rfl: 3    atorvastatin (LIPITOR) 20 MG tablet, Take 1 tablet (20 mg) by mouth daily, Disp: 90 tablet, Rfl: 1    EPINEPHrine (ADRENACLICK) 0.3 MG/0.3ML injection, Inject 0.3 mLs (0.3 mg) into the muscle as needed for anaphylaxis, Disp: 0.6 mL, Rfl: 1    ketoconazole (NIZORAL) 2 % external shampoo, Apply 5 to 10 mL to wet scalp, lather, leave on 3 to 5 minutes, and rinse; apply twice weekly for 2 to 4 weeks., Disp: 120 mL, Rfl: 1     Allergies:    Allergies   Allergen Reactions    Wasp Venom Protein Anaphylaxis     Bee Stings    Bees      Bee Stings         Vitals: /79   Pulse 65   Ht 1.695 m (5' 6.75\")   Wt 84.4 kg (186 lb)   BMI 29.35 kg/m    BMI= Body mass index is 29.35 kg/m .    LOWER EXTREMITY PHYSICAL EXAM    Dermatologic: Skin is intact to left lower extremity without significant lesions, rash or abrasion.        Vascular: DP & PT pulses are intact & regular on the left.   CFT and skin temperature is normal to the left lower extremity.     Neurologic: Lower extremity sensation is intact to light touch.  No evidence of " weakness in the left lower extremity.        Musculoskeletal: Patient is ambulatory without assistive device or brace.  No gross ankle deformity noted.  No foot or ankle joint effusion is noted.  Noted moderate soft tissue mass over the left great toe.  No surrounding erythema or edema noted.  No pain with range of motion of the first metatarsophalangeal joint on the left.           ASSESSMENT / PLAN:     ICD-10-CM    1. Ganglion cyst of left foot  M67.472 Orthopedic  Referral     CANCELED: Orthopedic  Referral      2. Skin lesion  L98.9 Orthopedic  Referral          I have explained to Steffen about the conditions.  We discussed the underlying contributing factors to the condition as well as both conservative and surgical treatment options along with expected length of recovery.  At this time, The patient was referred to [facility] for an ultrasound guided aspiration and steroid injection of the ganglion cyst on the left great toe.      Steffen verbalized agreement with and understanding of the rational for the diagnosis and treatment plan.  All questions were answered to best of my ability and the patient's satisfaction. The patient was advised to contact the clinic with any questions that may arise after the clinic visit.      Disclaimer: This note consists of symbols derived from keyboarding, dictation and/or voice recognition software. As a result, there may be errors in the script that have gone undetected. Please consider this when interpreting information found in this chart.       MARGRET Miranda D.P.M., FSAGAR.F.A.S.

## 2024-01-08 NOTE — H&P (VIEW-ONLY)
"PATIENT HISTORY:  Steffen Mitchell is a 73 year old male who presents to clinic in consultation at the request of  Kris Godoy M.D. with a chief complaint of bump on toe of left foot.  The patient is seen by themselves.  The patient relates the pain is primarily located around the great left toe.  Reports insidious onset without acute precipitating event.  The patient relates that the symptoms have been going on for several week(s).  The patient has previously tried nothing with little relief.  The patient is retired.  Any previous notes and studies that pertain to the patient's condition were reviewed.    Pertinent medical, surgical and family history was reviewed in the [facility] chart.    Past Medical History:   Past Medical History:   Diagnosis Date    Arthritis        Medications:   Current Outpatient Medications:     ASPIRIN 81 MG OR TABS, 1 tab po QD (Once per day), Disp: 100, Rfl: 3    atorvastatin (LIPITOR) 20 MG tablet, Take 1 tablet (20 mg) by mouth daily, Disp: 90 tablet, Rfl: 1    EPINEPHrine (ADRENACLICK) 0.3 MG/0.3ML injection, Inject 0.3 mLs (0.3 mg) into the muscle as needed for anaphylaxis, Disp: 0.6 mL, Rfl: 1    ketoconazole (NIZORAL) 2 % external shampoo, Apply 5 to 10 mL to wet scalp, lather, leave on 3 to 5 minutes, and rinse; apply twice weekly for 2 to 4 weeks., Disp: 120 mL, Rfl: 1     Allergies:    Allergies   Allergen Reactions    Wasp Venom Protein Anaphylaxis     Bee Stings    Bees      Bee Stings         Vitals: /79   Pulse 65   Ht 1.695 m (5' 6.75\")   Wt 84.4 kg (186 lb)   BMI 29.35 kg/m    BMI= Body mass index is 29.35 kg/m .    LOWER EXTREMITY PHYSICAL EXAM    Dermatologic: Skin is intact to left lower extremity without significant lesions, rash or abrasion.        Vascular: DP & PT pulses are intact & regular on the left.   CFT and skin temperature is normal to the left lower extremity.     Neurologic: Lower extremity sensation is intact to light touch.  No evidence of " weakness in the left lower extremity.        Musculoskeletal: Patient is ambulatory without assistive device or brace.  No gross ankle deformity noted.  No foot or ankle joint effusion is noted.  Noted moderate soft tissue mass over the left great toe.  No surrounding erythema or edema noted.  No pain with range of motion of the first metatarsophalangeal joint on the left.           ASSESSMENT / PLAN:     ICD-10-CM    1. Ganglion cyst of left foot  M67.472 Orthopedic  Referral     CANCELED: Orthopedic  Referral      2. Skin lesion  L98.9 Orthopedic  Referral          I have explained to Steffen about the conditions.  We discussed the underlying contributing factors to the condition as well as both conservative and surgical treatment options along with expected length of recovery.  At this time, The patient was referred to [facility] for an ultrasound guided aspiration and steroid injection of the ganglion cyst on the left great toe.      Steffen verbalized agreement with and understanding of the rational for the diagnosis and treatment plan.  All questions were answered to best of my ability and the patient's satisfaction. The patient was advised to contact the clinic with any questions that may arise after the clinic visit.      Disclaimer: This note consists of symbols derived from keyboarding, dictation and/or voice recognition software. As a result, there may be errors in the script that have gone undetected. Please consider this when interpreting information found in this chart.       MARGRET Miranda D.P.M., FSAGAR.F.A.S.

## 2024-01-08 NOTE — PATIENT INSTRUCTIONS
[facility] Injection Discharge Instructions    Procedure: Left great toe cyst aspiration/steroid injection     You may shower, however avoid swimming, tub baths or hot tubs for 24 hours following your procedure  You may have a mild to moderate increase in pain for several days following the injection.  It may take up to 14 days for the steroid medication to start working although you may feel the effect as early as a few days after the procedure.  You may use ice packs for 10-15 minutes, 3 to 4 times a day at the injection site for comfort  You may use anti-inflammatory medications (such as Ibuprofen or Aleve or Advil) or Tylenol for pain control if necessary  If you were fasting, you may resume your normal diet and medications after the procedure  If you have diabetes, check your blood sugar more frequently than usual as your blood sugar may be higher than normal for 10-14 days following a steroid injection. Contact your doctor who manages your diabetes if your blood sugar is higher than usual    If you experience any of the following, call [facility] @ [phone] or [phone]  -Fever over 100 degree F  -Swelling, bleeding, redness, drainage, warmth at the injection site  - New or worsening pain     It was great seeing you today!    Tc Mock

## 2024-01-08 NOTE — PATIENT INSTRUCTIONS
GANGLION CYST  A ganglion cyst is a sac filled with a jellylike fluid that originates from a tendon sheath or joint capsule. The word  ganglion  means  knot  and is used to describe the knot-like mass or lump that forms below the surface of the skin.  Ganglion cysts are among the most common benign soft-tissue masses. Although they most often occur on the wrist, they also frequently develop on the foot - usually on the top, but elsewhere as well. Ganglion cysts vary in size, may get smaller and larger, and may even disappear completely, only to return later.  CAUSES  Although the exact cause of ganglion cysts is unknown, they may arise from trauma - whether a single event or repetitive micro-trauma.  SYMPTOMS  A noticeable lump - often this is the only symptom experienced   Tingling or burning, if the cyst is touching a nerve   Dull pain or ache - which may indicate the cyst is pressing against a tendon or joint   Difficulty wearing shoes due to irritation between the lump and the shoe   DIAGNOSIS  To diagnose a ganglion cyst, the foot and ankle surgeon will perform a thorough examination of the foot. The lump will be visually apparent and, when pressed in a certain way, it should move freely underneath the skin. Sometimes the surgeon will shine a light through the cyst or remove a small amount of fluid from the cyst for evaluation. Your doctor may take an x-ray, and in some cases additional imaging studies may be ordered.  NON-SURGICAL TREATMENT  Monitoring, but no treatment. If the cyst causes no pain and does not interfere with walking, the surgeon may decide it is best to carefully watch the cyst over a period of time.   Shoe modifications. Wearing shoes that do not rub the cyst or cause irritation may be advised. In addition, placing a pad inside the shoe may help reduce pressure against the cyst.   Aspiration and injection. This technique involves draining the fluid and then injecting a steroid medication into  the mass. More than one session may be needed. Although this approach is successful in some cases, in many others the cyst returns.   SURGICAL TREATMENT  When other treatment options fail or are not appropriate, the cyst may need to be surgically removed. While the recurrence rate associated with surgery is much lower than that experienced with aspiration and injection therapy, there are nevertheless cases in which the ganglion cyst returns.

## 2024-01-13 RX ORDER — BETAMETHASONE SODIUM PHOSPHATE AND BETAMETHASONE ACETATE 3; 3 MG/ML; MG/ML
0.5 INJECTION, SUSPENSION INTRA-ARTICULAR; INTRALESIONAL; INTRAMUSCULAR; SOFT TISSUE
Status: SHIPPED | OUTPATIENT
Start: 2024-01-08

## 2024-01-13 RX ORDER — ROPIVACAINE HYDROCHLORIDE 5 MG/ML
0.5 INJECTION, SOLUTION EPIDURAL; INFILTRATION; PERINEURAL
Status: SHIPPED | OUTPATIENT
Start: 2024-01-08

## 2024-01-31 ENCOUNTER — OFFICE VISIT (OUTPATIENT)
Dept: NEUROPSYCHOLOGY | Facility: CLINIC | Age: 74
End: 2024-01-31
Attending: FAMILY MEDICINE
Payer: COMMERCIAL

## 2024-01-31 DIAGNOSIS — G31.84 MILD NEUROCOGNITIVE DISORDER: Primary | ICD-10-CM

## 2024-01-31 DIAGNOSIS — R41.89 COGNITIVE DECLINE: ICD-10-CM

## 2024-01-31 PROCEDURE — 96138 PSYCL/NRPSYC TECH 1ST: CPT | Performed by: CLINICAL NEUROPSYCHOLOGIST

## 2024-01-31 PROCEDURE — 96139 PSYCL/NRPSYC TST TECH EA: CPT | Performed by: CLINICAL NEUROPSYCHOLOGIST

## 2024-01-31 PROCEDURE — 96116 NUBHVL XM PHYS/QHP 1ST HR: CPT | Performed by: CLINICAL NEUROPSYCHOLOGIST

## 2024-01-31 PROCEDURE — 96132 NRPSYC TST EVAL PHYS/QHP 1ST: CPT | Performed by: CLINICAL NEUROPSYCHOLOGIST

## 2024-01-31 PROCEDURE — 96133 NRPSYC TST EVAL PHYS/QHP EA: CPT | Performed by: CLINICAL NEUROPSYCHOLOGIST

## 2024-01-31 NOTE — PROGRESS NOTES
NEUROPSYCHOLOGY CONSULT  [facility]    NAME: Steffen Mitchell    YOB: 1950   AGE: 73  EDU: 12  DATE OF EVALUATION: 1/31/2024    REASON FOR REFERRAL:  Mr. Mitchell is a 73 year old, right-handed, White male presenting with concerns about cognitive functioning. He was referred for a neurocognitive evaluation by his provider, Dr. Kris Godoy MD to assist with differential diagnosis and care planning.     DIAGNOSTIC SUMMARY:  Due to recent increases in COVID-19 cases this assessment was conducted using face-to-face methods with PPE worn by the provider and examiner (but not by the patient). The standard administration of these tests involves in-person, direct face-to-face methods. The full impact of applying non-standard administration methods with PPE is not fully appreciated at this time. The diagnostic conclusions and recommendations provided in this report are being advanced with caution.    Results of testing indicate that Mr. Mitchell is a gentleman of estimated average premorbid intellectual functioning whose performance is notable for mildly impaired verbal abstraction as well as moderately impaired visual-spatial planning and organization. These limited weaknesses were evident in the context of otherwise intact performance on measures of basic attention, cognitive efficiency, verbal learning, verbal memory, visuospatial reasoning skills, all other measures of language (sight word reading, fund of general knowledge, semantic fluency, confrontation naming), and all other measures of executive functioning (working memory, complex attention, inhibition, phonemic fluency, deductive reasoning). On self-report questionnaires, he denied any significant symptoms of depression or anxiety.     I was able to share the above results along with my impressions and recommendations (see below) with Mr. Mitchell on the day of testing. I provided the opportunity for him to ask  questions about the evaluation and results. At the end of our meeting, Mr. Mitchell indicated that he understood the results and that I had answered all of his questions. He was encouraged to reach out to me (contact information included in the AVS) should any further questions or concerns arise in the future. I explained that I would send the impressions and recommendations from the report as part of the After Visit Summary (which he elected to receive via Copytele) and that Dr. Godoy would be receiving the full report as the consulting provider as would his PCP.     Summary for Providers  ASSESSMENT:  Impairments identified only on two measures (verbal abstraction, visual-spatial planning and organization) with performance on all other measures of language and executive functioning assessed solidly within normal limits  Profile otherwise intact on measures of attention, cognitive efficiency, verbal learning, verbal memory, visual-spatial reasoning skills, and the vast majority of measures of language and executive functioning  While some impairments were evident, there is no consistent evidence to support impairment within a single cognitive domain and the observed deficits likely reflect nonspecific factors such as fluctuations in attention during testing and an impulsive response style  At this time, there is no compelling evidence to suggest the presence of acquired cognitive dysfunction or a neurodegenerative process  His experience of cognitive inefficiencies in daily life is best explained by nonspecific fluctuations in attention and normal aging  He does not meet criteria for a cognitive diagnosis at this time  Diagnosis: No cognitive diagnosis, normal aging    PLAN:  No concerns for his continued ability to live independently, drive, volunteer, or manage his finances or personal affairs  He should continue to take care of himself from a physical perspective including medication adherence, healthy diet and  regular exercise  He should continue to stay cognitively active including continued regular participation in volunteer activities  No specific follow-up in Neuropsychology is recommended at this time but the current results can be used as a baseline should cognitive concerns arise in the future    FEEDBACK:  Mr. Mitchell received the results of this evaluation on the day of testing.     Thank you for allowing me to participate in Mr. Mitchell's care.  Please contact me with any questions regarding the content of this report.      Summary for Patients  DIAGNOSTIC IMPRESSIONS (from 1/31/2024 Neuropsychology Consult):    Results  Isolated impairments on one measure of language and one measure of problem solving but with otherwise solidly intact performance on all other measures of language and problem-solving    Otherwise performance intact for his age across measures of attention, speed of thinking, verbal learning, verbal memory, language, spatial skills, and problem-solving skills    No evidence of cognitive decline    Diagnosis  No Cognitive Diagnosis: Normal Aging    RECOMMENDATIONS:  Driving and Activities of Daily Living  Mr. Mitchell should be reassured that his mental faculties are well preserved for his age  There are no concerns with Mr. Mitchell s current ability to continue to live independently, volunteer, drive, or manage his finances or personal affairs.  Physical and Emotional Health  It is important that Mr. Mitchell continue to adhere to his medication treatment regimen and follow a healthy diet so as to maintain his physical health, as this can have a significant impact on his physical, emotional, and cognitive functioning.   Under the guidance of his physician, It is recommended that regular exercise continue to be integrated into Mr. Mitchell's routine as it will likely benefit him cognitively and emotionally as well as physically.   Mr. Mitchell does not appear to be struggling with any significant emotional  symptoms. Behavioral activation techniques such as regular exercise (under the guidance of his physician), recreational activities and regular social interaction would likely be effective in helping Mr. Mitchell to continue to maintain his good mood.   Memory and Organization  Mr. Mitchell is encouraged to continue to engage in stimulating activities, (i.e., reading, card games, puzzles, regular volunteer activities) to keep him cognitively active.   Mr. Mitchell did not demonstrate memory deficits on testing; however, such difficulties are more likely to appear when he is especially tired/ fatigued, in pain or struggling with mood symptoms. In those situations, he will benefit from the following strategies.  In his daily life, Mr. Mitchell may find it helpful to post reminder notes around the house, make lists, and carry a small calendar so that he can feel more comfortable and confident in his ability to remember information. A daily planner could also be used as a memory book where important information is recorded and organized for future reference.   Follow-up  Given Mr. Mitchell's intact cognitive profile, no specific follow-up with Neuropsychology is recommended at this time.  However should cognitive concerns arise in the future, re-evaluation is recommended and current results can be used as a baseline for future comparison.    --------------------------------EXTENDED REPORT--------------------------------  Verbal consent for neuropsychological testing was received following the provision of information about the nature of the evaluation, and the opportunity to ask questions.     HISTORY OF PRESENTING PROBLEM:    Current Interview  Mr. Mitchell presented on his own and was an adequate historian. He was able to provide the following information.     Mr. Mitchell reports that he has never been good at remembering names of people, but beginning last fall he noticed lapses in his memory for names of streets and places, which  is unusual for him. His wife had commented on this as well; however, she apparently has since commented that this has improved. Mr. Mitchell himself feels as though these challenges come and go. He acknowledged occasionally forgetting details of conversations, but otherwise denied other forms of forgetfulness or concerns with attention, language, speed of thinking, spatial skills or problem solving skills.     With regard to the activities of daily living, Mr. Mitchell reported that he lives in a home with his wife. He is responsible for the cooking and completes this task without difficulty. He keeps track of appointments without difficulty. He just recently started his first prescription medication, Lipitor, a few months ago.  He continues to drive with no recent tickets, accidents or incidents of becoming lost.  His wife has always managed the family finances.    MEDICAL HISTORY:  Mr. Mitchell's medical history is significant for   Past Medical History:   Diagnosis Date    Arthritis      Mr. Nicole current problem list includes   Patient Active Problem List   Diagnosis    ALLERGY   **  SEE ALSO ALLERGIC REAC BEE STING    Sensorineural hearing loss    Subjective tinnitus    CARDIOVASCULAR SCREENING; LDL GOAL LESS THAN 160    Advanced directives, counseling/discussion    Screening for prostate cancer     Mr. Mitchell denied any history of stroke, seizure or head injury with loss of consciousness. He shared that he experienced COVID infection approximately 2 years ago which he described as associated with mild sinus difficulties.  He denied any significant breathing problems or need for hospitalization.    With regard to exercise, Mr. Mitchell indicated that he is much more active in the summer doing biking.  In the winter he generally likes to cross-country ski but has not been doing this as there has not been much snow this winter.    Mr. Mitchell indicated he has had hearing aids for about 15 years but otherwise denied any  "significant sensory changes or disturbance in appetite.  He described his sleep as \"great,\" indicating that he wakes rested most mornings.  He shared that his wife has told him he snores but otherwise denies any unusual sleep behaviors.    Diagnostic studies:  I could not locate any neuroimaging studies in the [facility] chart or in Care Everywhere.    Past Surgical History:   Procedure Laterality Date    COLONOSCOPY N/A 5/13/2015    Procedure: COLONOSCOPY;  Surgeon: Carlso Ovalles MD;  Location: [facility]    ORTHOPEDIC SURGERY      SURGICAL HISTORY OF -   6/30/2003     Colonoscopy and polypectomy with snare    SURGICAL HISTORY OF -   8/28/1984     Arthroscoopy Arthroscopic medial meniscectomy Left Knee    SURGICAL HISTORY OF -   3/29/2002    Right Knee,partial medial meniscectomy      Current medications include (per medical record):   Current Outpatient Medications:     ASPIRIN 81 MG OR TABS, 1 tab po QD (Once per day), Disp: 100, Rfl: 3    atorvastatin (LIPITOR) 20 MG tablet, Take 1 tablet (20 mg) by mouth daily, Disp: 90 tablet, Rfl: 1    EPINEPHrine (ADRENACLICK) 0.3 MG/0.3ML injection, Inject 0.3 mLs (0.3 mg) into the muscle as needed for anaphylaxis, Disp: 0.6 mL, Rfl: 1    ketoconazole (NIZORAL) 2 % external shampoo, Apply 5 to 10 mL to wet scalp, lather, leave on 3 to 5 minutes, and rinse; apply twice weekly for 2 to 4 weeks., Disp: 120 mL, Rfl: 1    Current Facility-Administered Medications:     0.5 mL ropivacaine (NAROPIN) injection 5 mg/mL, 0.5 mL, , , Tc Mock MD, 0.5 mL at 01/08/24 1037    betamethasone acet & sod phos (CELESTONE) injection 0.5 mL, 0.5 mL, , , Tc Mock MD, 0.5 mL at 01/08/24 1037.    RELEVANT FAMILY MEDICAL HISTORY:   Family neurologic history is significant for stroke in his father. Mr. Mitchell was otherwise unaware of any other neurologic or neurodegenerative conditions in the family.    Other family medical history is positive for the following:  Family History " "  Problem Relation Age of Onset    Hypertension Mother     Cerebrovascular Disease Mother     Hypertension Father     Cerebrovascular Disease Father 70    Diabetes Father     Eye Disorder Father         glacoma    Coronary Artery Disease Father     C.A.D. Brother         bypass surgery at age 55    Cardiovascular Brother         pulmonary hypertention    Respiratory Brother         sleep apnea    Heart Disease Brother         triple bypass     PSYCHIATRIC AND SUBSTANCE USE HISTORY:  With regard to his psychiatric history, Mr. Mitchell denied a history of past psychiatric conditions or mental health treatment.  At the current time, he described his mood as good adding that he has always been \"more of a half-full kind of person\".  He denied any suicidal ideation, plan or intent or hallucinations or delusions.     With regard to substance use, Mr. Mitchell denied any history of problematic alcohol use.  He indicated that he generally has a glass of wine with dinner a few times a week and then usually a couple beers on Sunday.  He indicated that he was a previous smoker but quit in the mid to late 1980s.  He denies any history of regular recreational drug use.    SOCIAL HISTORY:  Mr. Mitchell was born and raised in [place], [place]. He was unaware of any complications in his mother's pregnancy with him or in his birth, or delays in reaching developmental milestones. He denied a history of early learning or attention difficulties, individualized instruction, or grade repetition. He described himself as an average student, earning mostly C's in high school.  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    Mr. Mitchell shared that he worked as a  throughout his career.  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  He was there for just under 20 years and retired at age 57.    Mr. Mitchell shared that he has been  to his wife for 53 " years and has 2 children (1 daughter) and 4 grandsons.    For fun Mr. Mitchell enjoys traveling with his wife.  He noted that they also spend a lot of time in volunteer work.    BEHAVIORAL OBSERVATIONS:   Mr. Mitchell arrived  15 minutes early and unaccompanied to today's appointment. He was appropriately dressed and groomed. He was alert and engaged during the interview. Gait was unremarkable. His mood was euthmyic and his affect was appropriately reactive. Rapport was easily established and eye contact was unremarkable. He was pleasant and cooperative. Rate, prosody, and content of speech were grossly normal. No significant word finding difficulties or paraphasic errors were evident. There was no evidence of a olimpia thought disorder; no hallucinations or delusions were apparent. Judgment and insight appeared fair.       Mr. Mitchell appeared adequately motivated and engaged easily in the testing component of the evaluation. His performance was fully intact on embedded measures of performance validity. He attempted all tasks presented to him and worked at a steady pace.  He did not appear overly frustrated by difficult or challenging tasks and responded appropriately to encouragement from the examiner.  He was somewhat impulsive on RCFT, completing the task rather quickly. Otherwise, no significant barriers to testing were observed and the following is judged to be a valid representation of Mr. Mitchell's current cognitive strengths and weaknesses.    LIMITATIONS:  Due to recent rises in COVID-19 cases, this assessment was conducted using face-to-face testing with the examiner wearing [employer] designated PPE (but the patient did not wear a face mask). The standard administration of these procedures involves in-person, face-to-face methods without PPE. The impact of applying non-standard administration methods has been evaluated only in part by scientific research. While every effort was made to simulate standard  assessment practices, the diagnostic conclusions and recommendations for treatment provided in this report are being advanced with these limitations in mind.    TESTS ADMINISTERED:   [place] Naming Test (BNT), Category Fluency- AFV (CAT), Clock Drawing, Controlled Oral Word Association Test CFL (COWAT), Generalized Anxiety Disorder-7 (KATHLEEN-7), Geriatric Depression Scale 30 (GDS), Abdi Verbal Learning Test - R Form 1 (HVLT-R), Butch-Osterrieth Complex Figure Test, copy only (RCFT), Stroop Color and Word Test, Trail Making Test (TMT), WAIS-IV (Similarities, Information, Block Design, Matrix Reasoning, Digit Span), WRAT-5 Word Reading (blue), [place] Card Sorting Test-1 deck (WCST) and WMS-III Information and Orientation.    MOANS norms were used for BNT, CAT, COWAT, RCFT, Stroop, TMT  (Raw scores in parentheses)    DESCRIPTIVE PERFORMANCE KEY:    Labels for tests with Normal Distributions  Score Label Standard Score %ile Rank   Exceptionally high score  > 130 > 98   Above average score 120-129 91-97   High average score 110-119 75-90   Average score  25-74   Low average score 80-89 9-24   Below average score 70-79 2-8   Exceptionally low score < 70 < 2     Labels for tests with Non-Normal Distributions  Score Label %ile Rank   Within normal expectations/ limits score (WNL) > 24   Low average score 9-24   Below average score 2-8   Exceptionally low score < 2     The following test results utilize score labels as adapted from Renny Matos, Davon Montemayor, Elodia Gracia, BLANKA Craig, Helen Zendejas, Khalif Maravilla & Conference Participants (2020): American Academy of Clinical Neuropsychology consensus conference statement on uniform labeling of performance test scores, The Clinical Neuropsychologist, DOI: 10.1080/[email or web address]    All scores contain some measure of error; scores are reported here as they are obtained by the individual (without reference to the  "range of error). These are meant as labels and not interpretation of performance. While other relevant comments regarding task performance are provided below, please see the Assessment, Impressions and Diagnostic Summary sections of this report for interpretation of the scores and the cognitive profile as a whole, including what does and does not constitute impairment.    OPTIMAL PREMORBID INTELLECT:  Optimal premorbid intellectual abilities were estimated as falling in the average range based on Mr. Mitchell's educational and occupational histories and performance on tasks least likely to be affected by acquired brain dysfunction (i.e.,  hold tests ).    SUMMARY OF TEST RESULTS:     Orientation, Attention and Processing Speed  Mental status exam was measured as a within normal limits score for his age (14). He was oriented to person, place, time, and date and was able to correctly name the current and previous presidents.    Performance on a measure of basic attention and working memory was assessed as an average score (24). This reflected an average score for basic attention skills (LDF = 7) and an average score for working memory skills (LDB = 4, LDS = 4).     A speeded word reading task (77) was assessed as a low average to average score. A speeded color naming task (69) and a simple sequencing task (28\") were both assessed as a high average score.     Language  Sight word reading skills (62) were assessed as an average score. Fund of general knowledge (19) was assessed as a high average score. Verbal abstraction skills (12) were assessed as a below average score. His performance on a measure of semantic fluency was measured as an average score (44). Confrontation naming was measured as a within normal limits score (57).    Visuospatial Skills  Performance on a block construction task (45) resulted in an above average score. Performance on a pattern completion task (13) was measured as an average " score.    Learning and Memory  He was administered a measure of rote auditory verbal list learning that required him to learn a series of 12 words over three trials and retain and recall them over a delay. His initial rate of learning (4,7,7) reflected a low average score. He retained and recalled 7 words (100% retention) after the delay for an average score for delayed recall.  Recognition memory was measured as a low average score as he correctly identified 12 of the original words and made  4 false positive errors.     Executive Functioning  Working memory skills were assessed as an average score (LDB = 4, LDS = 4). A complex sequencing and set shifting task was measured as a high average score (72 ). This task was notable for one error. His performance on a measure of phonemic fluency resulted in an average score (37). On a measure of deductive reasoning and problem-solving, overall performance was assessed as a within normal limits score for his age and education in terms of the number of categories learned (3). His performance was not characterized by an error prone or perseverative response style (NPE= 8, MA= 7). His ability to inhibit an over-learned response was assessed as an average score (28). His performance on this task was notable for 4 errors. His copy of a complex figure was performed as an exceptionally low score for his age (16) and notable for an impulsive and somewhat careless approach with inattention to detail. His drawing of a clock was unremarkable.     Mood  On the Geriatric Depression Scale-30 (GDS), a self report measure of depressive symptomatology, he obtained a score of 1, placing him in the range of no significant symptoms of depression.     On the Generalized Anxiety Disorder-7, a self-report measure of anxiety, he obtained a score of 0, placing him in the range of minimal anxiety.     EVALUATION SERVICES AND TIME:   A clinical interview/neurobehavioral status examination was  conducted with the patient and documented. I thoroughly reviewed the medical record, selected the neuropsychological test battery, provided supervision to the individual who administered and scored the neuropsychological test battery, interpreted/integrated patient data and test results, engaged in clinical decision making, treatment planning, report writing/preparation, and provided and documented interactive feedback of test results on the day of testing . A trained examiner/technician directly administered and scored 2+ neuropsychological tests. Please see below for a breakdown of time spent and the associated codes billed for these services.     Services   Time Spent  CPT Codes   Neurobehavioral Status Exam:  (e.g., face-to-face, interpretation, report) 35 minutes 1 x 96116   Neuropsychological Evaluation Services:   (e.g., integration, interpretation, treatment planning, clinical decision making, feedback)   95 minutes   1 x 96132  1 x 96133        Neuropsychological Testing by Trained Examiner/Technician:  (e.g., test administration, scoring, 2+ tests administered)   105 minutes   1 x 96138  2 x 96139     Diagnosis:  No Cognitive Diagnosis: Normal Aging    For diagnostic and coding purposes, Mr. Mitchell presents with concerns about cognitive functioning and was referred for an evaluation of Cognitive decline and Mild Neurocognitive disorder. Feedback of results was provided on the day of testing.       Monica Kirk, PhD, LP, ABPP  Clinical Neuropsychologist, [id number]  Board Certified in Clinical Neuropsychology    [facility]  [address] [place] [address], [address]  [place] [address]  Phone:  [phone]

## 2024-01-31 NOTE — LETTER
1/31/2024       RE: Steffen Mitchell  [address]  [facility] [address]     Dear Colleague,    Thank you for referring your patient, Steffen Mitchell, to the [facility] NEUROPSYCHOLOGY [facility] at [facility]. Please see a copy of my visit note below.    NEUROPSYCHOLOGY CONSULT  [facility]    NAME: Steffen Mitchell    YOB: 1950   AGE: 73  EDU: 12  DATE OF EVALUATION: 1/31/2024    REASON FOR REFERRAL:  Mr. Mitchell is a 73 year old, right-handed, White male presenting with concerns about cognitive functioning. He was referred for a neurocognitive evaluation by his provider, Dr. rKis Godoy MD to assist with differential diagnosis and care planning.     DIAGNOSTIC SUMMARY:  Due to recent increases in COVID-19 cases this assessment was conducted using face-to-face methods with PPE worn by the provider and examiner (but not by the patient). The standard administration of these tests involves in-person, direct face-to-face methods. The full impact of applying non-standard administration methods with PPE is not fully appreciated at this time. The diagnostic conclusions and recommendations provided in this report are being advanced with caution.    Results of testing indicate that Mr. Mitchell is a gentleman of estimated average premorbid intellectual functioning whose performance is notable for mildly impaired verbal abstraction as well as moderately impaired visual-spatial planning and organization. These limited weaknesses were evident in the context of otherwise intact performance on measures of basic attention, cognitive efficiency, verbal learning, verbal memory, visuospatial reasoning skills, all other measures of language (sight word reading, fund of general knowledge, semantic fluency, confrontation naming), and all other measures of executive functioning (working memory, complex attention,  inhibition, phonemic fluency, deductive reasoning). On self-report questionnaires, he denied any significant symptoms of depression or anxiety.     I was able to share the above results along with my impressions and recommendations (see below) with Mr. Mitchell on the day of testing. I provided the opportunity for him to ask questions about the evaluation and results. At the end of our meeting, Mr. Mitchell indicated that he understood the results and that I had answered all of his questions. He was encouraged to reach out to me (contact information included in the AVS) should any further questions or concerns arise in the future. I explained that I would send the impressions and recommendations from the report as part of the After Visit Summary (which he elected to receive via [employer]) and that Dr. Godoy would be receiving the full report as the consulting provider as would his PCP.     Summary for Providers  ASSESSMENT:  Impairments identified only on two measures (verbal abstraction, visual-spatial planning and organization) with performance on all other measures of language and executive functioning assessed solidly within normal limits  Profile otherwise intact on measures of attention, cognitive efficiency, verbal learning, verbal memory, visual-spatial reasoning skills, and the vast majority of measures of language and executive functioning  While some impairments were evident, there is no consistent evidence to support impairment within a single cognitive domain and the observed deficits likely reflect nonspecific factors such as fluctuations in attention during testing and an impulsive response style  At this time, there is no compelling evidence to suggest the presence of acquired cognitive dysfunction or a neurodegenerative process  His experience of cognitive inefficiencies in daily life is best explained by nonspecific fluctuations in attention and normal aging  He does not meet criteria for a cognitive  diagnosis at this time  Diagnosis: No cognitive diagnosis, normal aging    PLAN:  No concerns for his continued ability to live independently, drive, volunteer, or manage his finances or personal affairs  He should continue to take care of himself from a physical perspective including medication adherence, healthy diet and regular exercise  He should continue to stay cognitively active including continued regular participation in volunteer activities  No specific follow-up in Neuropsychology is recommended at this time but the current results can be used as a baseline should cognitive concerns arise in the future    FEEDBACK:  Mr. Mitchell received the results of this evaluation on the day of testing.     Thank you for allowing me to participate in Mr. Mitchell's care.  Please contact me with any questions regarding the content of this report.      Summary for Patients  DIAGNOSTIC IMPRESSIONS (from 1/31/2024 Neuropsychology Consult):    Results  Isolated impairments on one measure of language and one measure of problem solving but with otherwise solidly intact performance on all other measures of language and problem-solving    Otherwise performance intact for his age across measures of attention, speed of thinking, verbal learning, verbal memory, language, spatial skills, and problem-solving skills    No evidence of cognitive decline    Diagnosis  No Cognitive Diagnosis: Normal Aging    RECOMMENDATIONS:  Driving and Activities of Daily Living  Mr. Mitchell should be reassured that his mental faculties are well preserved for his age  There are no concerns with Mr. Mitchell s current ability to continue to live independently, volunteer, drive, or manage his finances or personal affairs.  Physical and Emotional Health  It is important that Mr. Mitchell continue to adhere to his medication treatment regimen and follow a healthy diet so as to maintain his physical health, as this can have a significant impact on his physical,  emotional, and cognitive functioning.   Under the guidance of his physician, It is recommended that regular exercise continue to be integrated into Mr. Mitchell's routine as it will likely benefit him cognitively and emotionally as well as physically.   Mr. Mitchell does not appear to be struggling with any significant emotional symptoms. Behavioral activation techniques such as regular exercise (under the guidance of his physician), recreational activities and regular social interaction would likely be effective in helping Mr. Mitchell to continue to maintain his good mood.   Memory and Organization  Mr. Mitchell is encouraged to continue to engage in stimulating activities, (i.e., reading, card games, puzzles, regular volunteer activities) to keep him cognitively active.   Mr. Mitchell did not demonstrate memory deficits on testing; however, such difficulties are more likely to appear when he is especially tired/ fatigued, in pain or struggling with mood symptoms. In those situations, he will benefit from the following strategies.  In his daily life, Mr. Mitchell may find it helpful to post reminder notes around the house, make lists, and carry a small calendar so that he can feel more comfortable and confident in his ability to remember information. A daily planner could also be used as a memory book where important information is recorded and organized for future reference.   Follow-up  Given Mr. Mitchell's intact cognitive profile, no specific follow-up with Neuropsychology is recommended at this time.  However should cognitive concerns arise in the future, re-evaluation is recommended and current results can be used as a baseline for future comparison.    --------------------------------EXTENDED REPORT--------------------------------  Verbal consent for neuropsychological testing was received following the provision of information about the nature of the evaluation, and the opportunity to ask questions.     HISTORY OF  PRESENTING PROBLEM:    Current Interview  Mr. Mitchell presented on his own and was an adequate historian. He was able to provide the following information.     Mr. Mitchell reports that he has never been good at remembering names of people, but beginning last fall he noticed lapses in his memory for names of streets and places, which is unusual for him. His wife had commented on this as well; however, she apparently has since commented that this has improved. Mr. Mitchell himself feels as though these challenges come and go. He acknowledged occasionally forgetting details of conversations, but otherwise denied other forms of forgetfulness or concerns with attention, language, speed of thinking, spatial skills or problem solving skills.     With regard to the activities of daily living, Mr. Mitchell reported that he lives in a home with his wife. He is responsible for the cooking and completes this task without difficulty. He keeps track of appointments without difficulty. He just recently started his first prescription medication, Lipitor, a few months ago.  He continues to drive with no recent tickets, accidents or incidents of becoming lost.  His wife has always managed the family finances.    MEDICAL HISTORY:  Mr. Mitchell's medical history is significant for   Past Medical History:   Diagnosis Date    Arthritis      Mr. Mitchell's current problem list includes   Patient Active Problem List   Diagnosis    ALLERGY   **  SEE ALSO ALLERGIC REAC BEE STING    Sensorineural hearing loss    Subjective tinnitus    CARDIOVASCULAR SCREENING; LDL GOAL LESS THAN 160    Advanced directives, counseling/discussion    Screening for prostate cancer     Mr. Mitchell denied any history of stroke, seizure or head injury with loss of consciousness. He shared that he experienced COVID infection approximately 2 years ago which he described as associated with mild sinus difficulties.  He denied any significant breathing problems or need for  "hospitalization.    With regard to exercise, Mr. Mitchell indicated that he is much more active in the summer doing biking.  In the winter he generally likes to cross-country ski but has not been doing this as there has not been much snow this winter.    Mr. Mitchell indicated he has had hearing aids for about 15 years but otherwise denied any significant sensory changes or disturbance in appetite.  He described his sleep as \"great,\" indicating that he wakes rested most mornings.  He shared that his wife has told him he snores but otherwise denies any unusual sleep behaviors.    Diagnostic studies:  I could not locate any neuroimaging studies in the [facility] chart or in Care Everywhere.    Past Surgical History:   Procedure Laterality Date    COLONOSCOPY N/A 5/13/2015    Procedure: COLONOSCOPY;  Surgeon: Carlos Ovalles MD;  Location: [facility]    ORTHOPEDIC SURGERY      SURGICAL HISTORY OF -   6/30/2003     Colonoscopy and polypectomy with snare    SURGICAL HISTORY OF -   8/28/1984     Arthroscoopy Arthroscopic medial meniscectomy Left Knee    SURGICAL HISTORY OF -   3/29/2002    Right Knee,partial medial meniscectomy      Current medications include (per medical record):   Current Outpatient Medications:     ASPIRIN 81 MG OR TABS, 1 tab po QD (Once per day), Disp: 100, Rfl: 3    atorvastatin (LIPITOR) 20 MG tablet, Take 1 tablet (20 mg) by mouth daily, Disp: 90 tablet, Rfl: 1    EPINEPHrine (ADRENACLICK) 0.3 MG/0.3ML injection, Inject 0.3 mLs (0.3 mg) into the muscle as needed for anaphylaxis, Disp: 0.6 mL, Rfl: 1    ketoconazole (NIZORAL) 2 % external shampoo, Apply 5 to 10 mL to wet scalp, lather, leave on 3 to 5 minutes, and rinse; apply twice weekly for 2 to 4 weeks., Disp: 120 mL, Rfl: 1    Current Facility-Administered Medications:     0.5 mL ropivacaine (NAROPIN) injection 5 mg/mL, 0.5 mL, , , Tc Mock MD, 0.5 mL at 01/08/24 1037    betamethasone acet & sod phos (CELESTONE) injection 0.5 mL, 0.5 mL, " ", , Tc Mock MD, 0.5 mL at 01/08/24 1037.    RELEVANT FAMILY MEDICAL HISTORY:   Family neurologic history is significant for stroke in his father. Mr. Mitchell was otherwise unaware of any other neurologic or neurodegenerative conditions in the family.    Other family medical history is positive for the following:  Family History   Problem Relation Age of Onset    Hypertension Mother     Cerebrovascular Disease Mother     Hypertension Father     Cerebrovascular Disease Father 70    Diabetes Father     Eye Disorder Father         glacoma    Coronary Artery Disease Father     C.A.D. Brother         bypass surgery at age 55    Cardiovascular Brother         pulmonary hypertention    Respiratory Brother         sleep apnea    Heart Disease Brother         triple bypass     PSYCHIATRIC AND SUBSTANCE USE HISTORY:  With regard to his psychiatric history, Mr. Mitchell denied a history of past psychiatric conditions or mental health treatment.  At the current time, he described his mood as good adding that he has always been \"more of a half-full kind of person\".  He denied any suicidal ideation, plan or intent or hallucinations or delusions.     With regard to substance use, Mr. Mitchell denied any history of problematic alcohol use.  He indicated that he generally has a glass of wine with dinner a few times a week and then usually a couple beers on Sunday.  He indicated that he was a previous smoker but quit in the mid to late 1980s.  He denies any history of regular recreational drug use.    SOCIAL HISTORY:  Mr. Mitchell was born and raised in [place], [place]. He was unaware of any complications in his mother's pregnancy with him or in his birth, or delays in reaching developmental milestones. He denied a history of early learning or attention difficulties, individualized instruction, or grade repetition. He described himself as an average student, earning mostly C's in high school.  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    Mr. Mitchell shared that he worked as a  throughout his career.  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  He was there for just under 20 years and retired at age 57.    Mr. Mitchell shared that he has been  to his wife for 53 years and has 2 children (1 daughter) and 4 grandsons.    For fun Mr. Mitchell enjoys traveling with his wife.  He noted that they also spend a lot of time in volunteer work.    BEHAVIORAL OBSERVATIONS:   Mr. Mitchell arrived  15 minutes early and unaccompanied to today's appointment. He was appropriately dressed and groomed. He was alert and engaged during the interview. Gait was unremarkable. His mood was euthmyic and his affect was appropriately reactive. Rapport was easily established and eye contact was unremarkable. He was pleasant and cooperative. Rate, prosody, and content of speech were grossly normal. No significant word finding difficulties or paraphasic errors were evident. There was no evidence of a olimpia thought disorder; no hallucinations or delusions were apparent. Judgment and insight appeared fair.       Mr. Mitchell appeared adequately motivated and engaged easily in the testing component of the evaluation. His performance was fully intact on embedded measures of performance validity. He attempted all tasks presented to him and worked at a steady pace.  He did not appear overly frustrated by difficult or challenging tasks and responded appropriately to encouragement from the examiner.  He was somewhat impulsive on RCFT, completing the task rather quickly. Otherwise, no significant barriers to testing were observed and the following is judged to be a valid representation of Mr. Mitchell's current cognitive strengths and weaknesses.    LIMITATIONS:  Due to recent rises in COVID-19 cases, this assessment was conducted using face-to-face testing with the examiner wearing  [facility] designated PPE (but the patient did not wear a face mask). The standard administration of these procedures involves in-person, face-to-face methods without PPE. The impact of applying non-standard administration methods has been evaluated only in part by scientific research. While every effort was made to simulate standard assessment practices, the diagnostic conclusions and recommendations for treatment provided in this report are being advanced with these limitations in mind.    TESTS ADMINISTERED:   Adams Naming Test (BNT), Category Fluency- AFV (CAT), Clock Drawing, Controlled Oral Word Association Test CFL (COWAT), Generalized Anxiety Disorder-7 (KATHLEEN-7), Geriatric Depression Scale 30 (GDS), Abdi Verbal Learning Test - R Form 1 (HVLT-R), Butch-Osterrieth Complex Figure Test, copy only (RCFT), Stroop Color and Word Test, Trail Making Test (TMT), WAIS-IV (Similarities, Information, Block Design, Matrix Reasoning, Digit Span), WRAT-5 Word Reading (blue), [place] Card Sorting Test-1 deck (WCST) and WMS-III Information and Orientation.    MOANS norms were used for BNT, CAT, COWAT, RCFT, Stroop, TMT  (Raw scores in parentheses)    DESCRIPTIVE PERFORMANCE KEY:    Labels for tests with Normal Distributions  Score Label Standard Score %ile Rank   Exceptionally high score  > 130 > 98   Above average score 120-129 91-97   High average score 110-119 75-90   Average score  25-74   Low average score 80-89 9-24   Below average score 70-79 2-8   Exceptionally low score < 70 < 2     Labels for tests with Non-Normal Distributions  Score Label %ile Rank   Within normal expectations/ limits score (WNL) > 24   Low average score 9-24   Below average score 2-8   Exceptionally low score < 2     The following test results utilize score labels as adapted from Renny Matos, Davon Montemayor, Elodia Gracia, BLANKA Craig, Helen Zendejas, Quoc Blas, Khalif Navarro & Conference  "Participants (2020): American Academy of Clinical Neuropsychology consensus conference statement on uniform labeling of performance test scores, The Clinical Neuropsychologist, DOI: 10.1080/[email or web address]    All scores contain some measure of error; scores are reported here as they are obtained by the individual (without reference to the range of error). These are meant as labels and not interpretation of performance. While other relevant comments regarding task performance are provided below, please see the Assessment, Impressions and Diagnostic Summary sections of this report for interpretation of the scores and the cognitive profile as a whole, including what does and does not constitute impairment.    OPTIMAL PREMORBID INTELLECT:  Optimal premorbid intellectual abilities were estimated as falling in the average range based on Mr. Mitchell's educational and occupational histories and performance on tasks least likely to be affected by acquired brain dysfunction (i.e.,  hold tests ).    SUMMARY OF TEST RESULTS:     Orientation, Attention and Processing Speed  Mental status exam was measured as a within normal limits score for his age (14). He was oriented to person, place, time, and date and was able to correctly name the current and previous presidents.    Performance on a measure of basic attention and working memory was assessed as an average score (24). This reflected an average score for basic attention skills (LDF = 7) and an average score for working memory skills (LDB = 4, LDS = 4).     A speeded word reading task (77) was assessed as a low average to average score. A speeded color naming task (69) and a simple sequencing task (28\") were both assessed as a high average score.     Language  Sight word reading skills (62) were assessed as an average score. Fund of general knowledge (19) was assessed as a high average score. Verbal abstraction skills (12) were assessed as a below average score. His " performance on a measure of semantic fluency was measured as an average score (44). Confrontation naming was measured as a within normal limits score (57).    Visuospatial Skills  Performance on a block construction task (45) resulted in an above average score. Performance on a pattern completion task (13) was measured as an average score.    Learning and Memory  He was administered a measure of rote auditory verbal list learning that required him to learn a series of 12 words over three trials and retain and recall them over a delay. His initial rate of learning (4,7,7) reflected a low average score. He retained and recalled 7 words (100% retention) after the delay for an average score for delayed recall.  Recognition memory was measured as a low average score as he correctly identified 12 of the original words and made  4 false positive errors.     Executive Functioning  Working memory skills were assessed as an average score (LDB = 4, LDS = 4). A complex sequencing and set shifting task was measured as a high average score (72 ). This task was notable for one error. His performance on a measure of phonemic fluency resulted in an average score (37). On a measure of deductive reasoning and problem-solving, overall performance was assessed as a within normal limits score for his age and education in terms of the number of categories learned (3). His performance was not characterized by an error prone or perseverative response style (NPE= 8, PA= 7). His ability to inhibit an over-learned response was assessed as an average score (28). His performance on this task was notable for 4 errors. His copy of a complex figure was performed as an exceptionally low score for his age (16) and notable for an impulsive and somewhat careless approach with inattention to detail. His drawing of a clock was unremarkable.     Mood  On the Geriatric Depression Scale-30 (GDS), a self report measure of depressive symptomatology, he  obtained a score of 1, placing him in the range of no significant symptoms of depression.     On the Generalized Anxiety Disorder-7, a self-report measure of anxiety, he obtained a score of 0, placing him in the range of minimal anxiety.     EVALUATION SERVICES AND TIME:   A clinical interview/neurobehavioral status examination was conducted with the patient and documented. I thoroughly reviewed the medical record, selected the neuropsychological test battery, provided supervision to the individual who administered and scored the neuropsychological test battery, interpreted/integrated patient data and test results, engaged in clinical decision making, treatment planning, report writing/preparation, and provided and documented interactive feedback of test results on the day of testing . A trained examiner/technician directly administered and scored 2+ neuropsychological tests. Please see below for a breakdown of time spent and the associated codes billed for these services.     Services   Time Spent  CPT Codes   Neurobehavioral Status Exam:  (e.g., face-to-face, interpretation, report) 35 minutes 1 x 96116   Neuropsychological Evaluation Services:   (e.g., integration, interpretation, treatment planning, clinical decision making, feedback)   95 minutes   1 x 96132  1 x 96133        Neuropsychological Testing by Trained Examiner/Technician:  (e.g., test administration, scoring, 2+ tests administered)   105 minutes   1 x 96138  2 x 96139     Diagnosis:  No Cognitive Diagnosis: Normal Aging    For diagnostic and coding purposes, Mr. Mitchell presents with concerns about cognitive functioning and was referred for an evaluation of Cognitive decline and Mild Neurocognitive disorder. Feedback of results was provided on the day of testing.       Monica Kirk, PhD, LP, ABPP  Clinical Neuropsychologist, [id number]  Board Certified in Clinical Neuropsychology    [facility]  Professional [address], [address]  [place] [address]  Phone:  [phone]    The patient was seen for a neuropsychological evaluation for the purposes of diagnostic clarification and treatment planning. 75 minutes of face-to-face testing were provided by this writer. An additional 30 minutes were spent scoring and compiling test results. The patient was cooperative with testing. No concerns were brought to my attention. Please see Dr. Kirk's report for a detailed description of the charges and interpretation and integration of the findings.

## 2024-01-31 NOTE — PROGRESS NOTES
The patient was seen for a neuropsychological evaluation for the purposes of diagnostic clarification and treatment planning. 75 minutes of face-to-face testing were provided by this writer. An additional 30 minutes were spent scoring and compiling test results. The patient was cooperative with testing. No concerns were brought to my attention. Please see Dr. Kirk's report for a detailed description of the charges and interpretation and integration of the findings.

## 2024-02-02 ENCOUNTER — ANESTHESIA EVENT (OUTPATIENT)
Dept: GASTROENTEROLOGY | Facility: CLINIC | Age: 74
End: 2024-02-02
Payer: COMMERCIAL

## 2024-02-02 ASSESSMENT — LIFESTYLE VARIABLES: TOBACCO_USE: 1

## 2024-02-02 NOTE — ANESTHESIA PREPROCEDURE EVALUATION
Anesthesia Pre-Procedure Evaluation    Patient: Steffen Mitchell   MRN: [id number] : 1950        Procedure : Procedure(s):  Colonoscopy          Past Medical History:   Diagnosis Date     Arthritis       Past Surgical History:   Procedure Laterality Date     COLONOSCOPY N/A 2015    Procedure: COLONOSCOPY;  Surgeon: Carlos Ovalles MD;  Location: [facility]     ORTHOPEDIC SURGERY       SURGICAL HISTORY OF -   2003     Colonoscopy and polypectomy with snare     SURGICAL HISTORY OF -   1984     Arthroscoopy Arthroscopic medial meniscectomy Left Knee     SURGICAL HISTORY OF -   3/29/2002    Right Knee,partial medial meniscectomy       Allergies   Allergen Reactions     Wasp Venom Protein Anaphylaxis     Bee Stings     Bees      Bee Stings        Social History     Tobacco Use     Smoking status: Former     Packs/day: 1.00     Years: 10.00     Additional pack years: 0.00     Total pack years: 10.00     Types: Cigarettes     Start date: 1966     Quit date: 1975     Years since quittin.1     Passive exposure: Past     Smokeless tobacco: Former     Quit date: 1980   Substance Use Topics     Alcohol use: Yes     Comment: occ has wine with dinner      Wt Readings from Last 1 Encounters:   24 84.4 kg (186 lb)        Anesthesia Evaluation   Pt has had prior anesthetic.         ROS/MED HX  ENT/Pulmonary:     (+)                tobacco use, Past use,                       Neurologic:  - neg neurologic ROS     Cardiovascular:       METS/Exercise Tolerance:     Hematologic:  - neg hematologic  ROS     Musculoskeletal:  - neg musculoskeletal ROS     GI/Hepatic:  - neg GI/hepatic ROS     Renal/Genitourinary:  - neg Renal ROS     Endo:  - neg endo ROS     Psychiatric/Substance Use:  - neg psychiatric ROS     Infectious Disease:  - neg infectious disease ROS     Malignancy:  - neg malignancy ROS     Other:            Physical Exam    Airway        Mallampati: II   TM distance: > 3 FB   Neck  "ROM: full     Respiratory Devices and Support         Dental    unable to assess        Cardiovascular   cardiovascular exam normal          Pulmonary   pulmonary exam normal            OUTSIDE LABS:  CBC:   Lab Results   Component Value Date    WBC 3.0 (L) 10/26/2023    WBC 4.1 09/23/2021    HGB 14.9 10/26/2023    HGB 12.1 (L) 09/23/2021    HCT 44.4 10/26/2023    HCT 38.0 (L) 09/23/2021     10/26/2023     09/23/2021     BMP:   Lab Results   Component Value Date     10/26/2023     09/09/2021    POTASSIUM 4.9 10/26/2023    POTASSIUM 4.6 09/09/2021    CHLORIDE 104 10/26/2023    CHLORIDE 105 09/09/2021    CO2 24 10/26/2023    CO2 29 09/09/2021    BUN 20.5 10/26/2023    BUN 23 09/09/2021    CR 1.07 10/26/2023    CR 1.14 09/09/2021     (H) 10/26/2023     (H) 09/09/2021     COAGS: No results found for: \"PTT\", \"INR\", \"FIBR\"  POC: No results found for: \"BGM\", \"HCG\", \"HCGS\"  HEPATIC:   Lab Results   Component Value Date    ALBUMIN 3.2 (L) 09/09/2021    PROTTOTAL 7.1 09/09/2021    ALT 33 09/09/2021    AST 20 09/09/2021    ALKPHOS 76 09/09/2021    BILITOTAL 0.2 09/09/2021     OTHER:   Lab Results   Component Value Date    LACT 0.9 09/05/2021    KEZIA 9.0 10/26/2023    TSH 2.10 10/26/2023       Anesthesia Plan    ASA Status:  2       Anesthesia Type: General.     - Airway: Native airway   Induction: Intravenous.   Maintenance: TIVA.        Consents    Anesthesia Plan(s) and associated risks, benefits, and realistic alternatives discussed. Questions answered and patient/representative(s) expressed understanding.     - Discussed: Risks, Benefits and Alternatives for the PROCEDURE were discussed     - Discussed with:  Patient            Postoperative Care    Pain management: IV analgesics, Oral pain medications.   PONV prophylaxis: Ondansetron (or other 5HT-3), Dexamethasone or Solumedrol     Comments:             Conrado Adams CRNA, APRN CRNA    I have reviewed the pertinent notes and labs " "in the chart from the past 30 days and (re)examined the patient.  Any updates or changes from those notes are reflected in this note.              # Overweight: Estimated body mass index is 29.35 kg/m  as calculated from the following:    Height as of 1/8/24: 1.695 m (5' 6.75\").    Weight as of 1/8/24: 84.4 kg (186 lb).      "

## 2024-02-05 ENCOUNTER — ANESTHESIA (OUTPATIENT)
Dept: GASTROENTEROLOGY | Facility: CLINIC | Age: 74
End: 2024-02-05
Payer: COMMERCIAL

## 2024-02-05 ENCOUNTER — HOSPITAL ENCOUNTER (OUTPATIENT)
Facility: CLINIC | Age: 74
Discharge: HOME | End: 2024-02-05
Attending: SURGERY | Admitting: SURGERY
Payer: COMMERCIAL

## 2024-02-05 VITALS
SYSTOLIC BLOOD PRESSURE: 102 MMHG | DIASTOLIC BLOOD PRESSURE: 70 MMHG | HEART RATE: 62 BPM | RESPIRATION RATE: 16 BRPM | TEMPERATURE: 97 F | OXYGEN SATURATION: 95 %

## 2024-02-05 LAB — COLONOSCOPY: NORMAL

## 2024-02-05 PROCEDURE — 45380 COLONOSCOPY AND BIOPSY: CPT | Mod: PT,XU

## 2024-02-05 PROCEDURE — 45385 COLONOSCOPY W/LESION REMOVAL: CPT | Mod: PT | Performed by: SURGERY

## 2024-02-05 PROCEDURE — 258N000003 HC RX IP 258 OP 636: Performed by: SURGERY

## 2024-02-05 PROCEDURE — 250N000009 HC RX 250: Performed by: NURSE ANESTHETIST, CERTIFIED REGISTERED

## 2024-02-05 PROCEDURE — 250N000011 HC RX IP 250 OP 636: Performed by: NURSE ANESTHETIST, CERTIFIED REGISTERED

## 2024-02-05 PROCEDURE — 370N000017 HC ANESTHESIA TECHNICAL FEE, PER MIN: Performed by: SURGERY

## 2024-02-05 PROCEDURE — 88305 TISSUE EXAM BY PATHOLOGIST: CPT | Mod: TC | Performed by: SURGERY

## 2024-02-05 RX ORDER — SODIUM CHLORIDE, SODIUM LACTATE, POTASSIUM CHLORIDE, CALCIUM CHLORIDE 600; 310; 30; 20 MG/100ML; MG/100ML; MG/100ML; MG/100ML
INJECTION, SOLUTION INTRAVENOUS CONTINUOUS
Status: DISCONTINUED | OUTPATIENT
Start: 2024-02-05 | End: 2024-02-05 | Stop reason: HOSPADM

## 2024-02-05 RX ORDER — ONDANSETRON 2 MG/ML
4 INJECTION INTRAMUSCULAR; INTRAVENOUS EVERY 30 MIN PRN
Status: DISCONTINUED | OUTPATIENT
Start: 2024-02-05 | End: 2024-02-05 | Stop reason: HOSPADM

## 2024-02-05 RX ORDER — PROPOFOL 10 MG/ML
INJECTION, EMULSION INTRAVENOUS PRN
Status: DISCONTINUED | OUTPATIENT
Start: 2024-02-05 | End: 2024-02-05

## 2024-02-05 RX ORDER — PROPOFOL 10 MG/ML
INJECTION, EMULSION INTRAVENOUS CONTINUOUS PRN
Status: DISCONTINUED | OUTPATIENT
Start: 2024-02-05 | End: 2024-02-05

## 2024-02-05 RX ORDER — LIDOCAINE HYDROCHLORIDE 20 MG/ML
INJECTION, SOLUTION INFILTRATION; PERINEURAL PRN
Status: DISCONTINUED | OUTPATIENT
Start: 2024-02-05 | End: 2024-02-05

## 2024-02-05 RX ORDER — GLYCOPYRROLATE 0.2 MG/ML
INJECTION, SOLUTION INTRAMUSCULAR; INTRAVENOUS PRN
Status: DISCONTINUED | OUTPATIENT
Start: 2024-02-05 | End: 2024-02-05

## 2024-02-05 RX ORDER — ONDANSETRON 4 MG/1
4 TABLET, ORALLY DISINTEGRATING ORAL EVERY 30 MIN PRN
Status: DISCONTINUED | OUTPATIENT
Start: 2024-02-05 | End: 2024-02-05 | Stop reason: HOSPADM

## 2024-02-05 RX ORDER — LIDOCAINE 40 MG/G
CREAM TOPICAL
Status: DISCONTINUED | OUTPATIENT
Start: 2024-02-05 | End: 2024-02-05 | Stop reason: HOSPADM

## 2024-02-05 RX ADMIN — PROPOFOL 100 MG: 10 INJECTION, EMULSION INTRAVENOUS at 09:10

## 2024-02-05 RX ADMIN — PROPOFOL 200 MCG/KG/MIN: 10 INJECTION, EMULSION INTRAVENOUS at 09:10

## 2024-02-05 RX ADMIN — SODIUM CHLORIDE, POTASSIUM CHLORIDE, SODIUM LACTATE AND CALCIUM CHLORIDE: 600; 310; 30; 20 INJECTION, SOLUTION INTRAVENOUS at 08:08

## 2024-02-05 RX ADMIN — SODIUM CHLORIDE, POTASSIUM CHLORIDE, SODIUM LACTATE AND CALCIUM CHLORIDE: 600; 310; 30; 20 INJECTION, SOLUTION INTRAVENOUS at 09:07

## 2024-02-05 RX ADMIN — LIDOCAINE HYDROCHLORIDE 60 MG: 20 INJECTION, SOLUTION INFILTRATION; PERINEURAL at 09:10

## 2024-02-05 RX ADMIN — GLYCOPYRROLATE 0.1 MG: 0.2 INJECTION, SOLUTION INTRAMUSCULAR; INTRAVENOUS at 09:10

## 2024-02-05 ASSESSMENT — ACTIVITIES OF DAILY LIVING (ADL): ADLS_ACUITY_SCORE: 35

## 2024-02-05 NOTE — LETTER
Steffen BLACKWOOD [facility]  [address]  [place] [address]    February 9, 2024    Dear Steffen,  This letter is written to inform you of the results of your recent colonoscopy.  Your examination showed polyp(s) in your cecum, descending colon, and sigmoid colon. All polyps were removed in their entirety and sent for review by a pathologist. As you will see on the pathology report below, the tissue(s) were tubular adenomatous polyps and inflammatory polyps. Your examination was otherwise without abnormality.    Final Diagnosis   A: Large intestine, cecum, polyp, biopsy/polypectomy:  - Tubular adenoma negative for high-grade dysplasia and malignancy.      B: Large intestine, descending, polyps x3, biopsy/polypectomy:  - Tubular adenoma(s) negative for high-grade dysplasia and malignancy.  - Sessile serrated adenoma(s) negative for cytological dysplasia and malignancy.     C: Large intestine, sigmoid, polyp, biopsy/polypectomy:  - Inflammatory pseudopolyp negative for dysplasia and malignancy.        Adenomatous polyps are entirely benign (non-cancerous); however, patients who have developed these polyps are at an increased risk for developing additional polyps in the future. If these are not eventually removed, there is a risk of developing colon cancer. We will advise more frequent examinations with you because of the risk associated with this type of polyp.    Inflammatory polyps are polyps that test negative for any pathologic (abnormal) changes.    Given these findings, I recommend that you undergo a repeat colonoscopy in 3 year(s) for surveillance. We will enter you into a recall system so you receive a reminder closer to the time that you are due for repeat examination.     Please remember that this recommendation is made with the understanding that you are not experiencing persistent changes in bowel function, bleeding per rectum, and/or significant abdominal pain. If you experience these symptoms, please contact your  primary care provider for a further evaluation.     If you have any questions or concerns about the results of your colonoscopy or the appropriate follow-up, please contact my assistant at [phone].    Sincerely,        [facility], [facility]  ___

## 2024-02-05 NOTE — PATIENT INSTRUCTIONS
DIAGNOSTIC IMPRESSIONS (from 1/31/2024 Neuropsychology Consult):    Results  Isolated impairments on one measure of language and one measure of problem solving but with otherwise solidly intact performance on all other measures of language and problem-solving    Otherwise performance intact for his age across measures of attention, speed of thinking, verbal learning, verbal memory, language, spatial skills, and problem-solving skills    No evidence of cognitive decline    Diagnosis  No Cognitive Diagnosis: Normal Aging    RECOMMENDATIONS:  Driving and Activities of Daily Living  Mr. Mitchell should be reassured that his mental faculties are well preserved for his age  There are no concerns with Mr. Mitchell s current ability to continue to live independently, volunteer, drive, or manage his finances or personal affairs.  Physical and Emotional Health  It is important that Mr. Mitchell continue to adhere to his medication treatment regimen and follow a healthy diet so as to maintain his physical health, as this can have a significant impact on his physical, emotional, and cognitive functioning.   Under the guidance of his physician, It is recommended that regular exercise continue to be integrated into Mr. Mitchell's routine as it will likely benefit him cognitively and emotionally as well as physically.   Mr. Mitchell does not appear to be struggling with any significant emotional symptoms. Behavioral activation techniques such as regular exercise (under the guidance of his physician), recreational activities and regular social interaction would likely be effective in helping Mr. Mitchell to continue to maintain his good mood.   Memory and Organization  Mr. Mitchell is encouraged to continue to engage in stimulating activities, (i.e., reading, card games, puzzles, regular volunteer activities) to keep him cognitively active.   Mr. Mitchell did not demonstrate memory deficits on testing; however, such difficulties are more likely to  appear when he is especially tired/ fatigued, in pain or struggling with mood symptoms. In those situations, he will benefit from the following strategies.  In his daily life, Mr. Mitchell may find it helpful to post reminder notes around the house, make lists, and carry a small calendar so that he can feel more comfortable and confident in his ability to remember information. A daily planner could also be used as a memory book where important information is recorded and organized for future reference.   Follow-up  Given Mr. Mitchell's intact cognitive profile, no specific follow-up with Neuropsychology is recommended at this time.  However should cognitive concerns arise in the future, re-evaluation is recommended and current results can be used as a baseline for future comparison.

## 2024-02-05 NOTE — ANESTHESIA POSTPROCEDURE EVALUATION
Patient: Steffen Mitchell    Procedure: Procedure(s):  COLONOSCOPY, FLEXIBLE, WITH LESION REMOVAL USING SNARE       Anesthesia Type:  General    Note:  Disposition: Outpatient   Postop Pain Control: Uneventful            Sign Out: Well controlled pain   PONV: No   Neuro/Psych: Uneventful            Sign Out: Acceptable/Baseline neuro status   Airway/Respiratory: Uneventful            Sign Out: Acceptable/Baseline resp. status   CV/Hemodynamics: Uneventful            Sign Out: Acceptable CV status; No obvious hypovolemia; No obvious fluid overload   Other NRE: NONE   DID A NON-ROUTINE EVENT OCCUR? No       Last vitals:  Vitals Value Taken Time   BP 83/54 02/05/24 0930   Temp     Pulse 59 02/05/24 0930   Resp     SpO2 95 % 02/05/24 0931   Vitals shown include unfiled device data.    Electronically Signed By: CARLYLE Mathias CRNA  February 5, 2024  9:32 AM

## 2024-02-05 NOTE — ANESTHESIA CARE TRANSFER NOTE
Patient: Steffen Mitchell    Procedure: Procedure(s):  COLONOSCOPY, FLEXIBLE, WITH LESION REMOVAL USING SNARE       Diagnosis: Screen for colon cancer [Z12.11]  Diagnosis Additional Information: No value filed.    Anesthesia Type:   General     Note:    Oropharynx: oropharynx clear of all foreign objects  Level of Consciousness: drowsy  Oxygen Supplementation: nasal cannula    Independent Airway: airway patency satisfactory and stable  Dentition: dentition unchanged  Vital Signs Stable: post-procedure vital signs reviewed and stable  Report to RN Given: handoff report given  Patient transferred to: Phase II    Handoff Report: Identifed the Patient, Identified the Reponsible Provider, Reviewed the pertinent medical history, Discussed the surgical course, Reviewed Intra-OP anesthesia mangement and issues during anesthesia, Set expectations for post-procedure period and Allowed opportunity for questions and acknowledgement of understanding  Vitals:  Vitals Value Taken Time   BP     Temp     Pulse     Resp     SpO2         Electronically Signed By: CARLYLE Mathias CRNA  February 5, 2024  9:29 AM

## 2024-02-05 NOTE — INTERVAL H&P NOTE
"I have reviewed the surgical (or preoperative) H&P that is linked to this encounter, and examined the patient. There are no significant changes    Colon 2015 (nl); no blood thinner; no famhx of colon cancer    Clinical Conditions Present on Arrival:  Clinically Significant Risk Factors Present on Admission                 # Drug Induced Platelet Defect: home medication list includes an antiplatelet medication  # Overweight: Estimated body mass index is 29.35 kg/m  (pended) as calculated from the following:    Height as of this encounter: (P) 1.695 m (5' 6.75\").    Weight as of this encounter: (P) 84.4 kg (186 lb).       "

## 2024-02-06 LAB
PATH REPORT.COMMENTS IMP SPEC: NORMAL
PATH REPORT.COMMENTS IMP SPEC: NORMAL
PATH REPORT.FINAL DX SPEC: NORMAL
PATH REPORT.GROSS SPEC: NORMAL
PATH REPORT.MICROSCOPIC SPEC OTHER STN: NORMAL
PATH REPORT.RELEVANT HX SPEC: NORMAL
PHOTO IMAGE: NORMAL

## 2024-02-06 PROCEDURE — 88305 TISSUE EXAM BY PATHOLOGIST: CPT | Mod: 26 | Performed by: PATHOLOGY

## 2024-03-11 ENCOUNTER — PRE VISIT (OUTPATIENT)
Dept: NEUROLOGY | Facility: CLINIC | Age: 74
End: 2024-03-11

## 2024-03-25 ENCOUNTER — MYC MEDICAL ADVICE (OUTPATIENT)
Dept: FAMILY MEDICINE | Facility: CLINIC | Age: 74
End: 2024-03-25
Payer: COMMERCIAL

## 2024-03-25 DIAGNOSIS — E78.2 MIXED HYPERLIPIDEMIA: ICD-10-CM

## 2024-03-25 RX ORDER — ATORVASTATIN CALCIUM 20 MG/1
20 TABLET, FILM COATED ORAL DAILY
Qty: 90 TABLET | Refills: 0 | Status: SHIPPED | OUTPATIENT
Start: 2024-03-25 | End: 2024-05-09

## 2024-05-09 ENCOUNTER — OFFICE VISIT (OUTPATIENT)
Dept: FAMILY MEDICINE | Facility: CLINIC | Age: 74
End: 2024-05-09
Payer: COMMERCIAL

## 2024-05-09 VITALS
DIASTOLIC BLOOD PRESSURE: 74 MMHG | HEIGHT: 67 IN | HEART RATE: 58 BPM | OXYGEN SATURATION: 96 % | WEIGHT: 189 LBS | TEMPERATURE: 97.4 F | SYSTOLIC BLOOD PRESSURE: 124 MMHG | BODY MASS INDEX: 29.66 KG/M2 | RESPIRATION RATE: 18 BRPM

## 2024-05-09 DIAGNOSIS — D72.819 LEUKOPENIA, UNSPECIFIED TYPE: Primary | ICD-10-CM

## 2024-05-09 DIAGNOSIS — M67.472 GANGLION CYST OF LEFT FOOT: ICD-10-CM

## 2024-05-09 DIAGNOSIS — T63.444S BEE STING REACTION, UNDETERMINED INTENT, SEQUELA: ICD-10-CM

## 2024-05-09 DIAGNOSIS — Z12.5 SCREENING FOR PROSTATE CANCER: ICD-10-CM

## 2024-05-09 DIAGNOSIS — E78.2 MIXED HYPERLIPIDEMIA: ICD-10-CM

## 2024-05-09 LAB
ALBUMIN SERPL BCG-MCNC: 4.3 G/DL (ref 3.5–5.2)
ALP SERPL-CCNC: 82 U/L (ref 40–150)
ALT SERPL W P-5'-P-CCNC: 25 U/L (ref 0–70)
AST SERPL W P-5'-P-CCNC: 23 U/L (ref 0–45)
BILIRUB DIRECT SERPL-MCNC: <0.2 MG/DL (ref 0–0.3)
BILIRUB SERPL-MCNC: 0.6 MG/DL
CHOLEST SERPL-MCNC: 131 MG/DL
ERYTHROCYTE [DISTWIDTH] IN BLOOD BY AUTOMATED COUNT: 13.5 % (ref 10–15)
FASTING STATUS PATIENT QL REPORTED: YES
HCT VFR BLD AUTO: 40.8 % (ref 40–53)
HDLC SERPL-MCNC: 47 MG/DL
HGB BLD-MCNC: 13.5 G/DL (ref 13.3–17.7)
LDLC SERPL CALC-MCNC: 73 MG/DL
MCH RBC QN AUTO: 28.2 PG (ref 26.5–33)
MCHC RBC AUTO-ENTMCNC: 33.1 G/DL (ref 31.5–36.5)
MCV RBC AUTO: 85 FL (ref 78–100)
NONHDLC SERPL-MCNC: 84 MG/DL
PLATELET # BLD AUTO: 183 10E3/UL (ref 150–450)
PROT SERPL-MCNC: 7 G/DL (ref 6.4–8.3)
PSA SERPL DL<=0.01 NG/ML-MCNC: 1.44 NG/ML (ref 0–6.5)
RBC # BLD AUTO: 4.79 10E6/UL (ref 4.4–5.9)
TRIGL SERPL-MCNC: 56 MG/DL
WBC # BLD AUTO: 3.4 10E3/UL (ref 4–11)

## 2024-05-09 PROCEDURE — 85027 COMPLETE CBC AUTOMATED: CPT | Performed by: FAMILY MEDICINE

## 2024-05-09 PROCEDURE — G0103 PSA SCREENING: HCPCS | Performed by: FAMILY MEDICINE

## 2024-05-09 PROCEDURE — 80061 LIPID PANEL: CPT | Performed by: FAMILY MEDICINE

## 2024-05-09 PROCEDURE — 99214 OFFICE O/P EST MOD 30 MIN: CPT | Performed by: FAMILY MEDICINE

## 2024-05-09 PROCEDURE — 36415 COLL VENOUS BLD VENIPUNCTURE: CPT | Performed by: FAMILY MEDICINE

## 2024-05-09 PROCEDURE — 80076 HEPATIC FUNCTION PANEL: CPT | Performed by: FAMILY MEDICINE

## 2024-05-09 RX ORDER — EPINEPHRINE 0.3 MG/.3ML
0.3 INJECTION SUBCUTANEOUS PRN
Qty: 0.6 ML | Refills: 1 | Status: SHIPPED | OUTPATIENT
Start: 2024-05-09

## 2024-05-09 RX ORDER — ATORVASTATIN CALCIUM 20 MG/1
20 TABLET, FILM COATED ORAL DAILY
Qty: 90 TABLET | Refills: 3 | Status: SHIPPED | OUTPATIENT
Start: 2024-05-09 | End: 2024-06-17

## 2024-05-09 ASSESSMENT — PAIN SCALES - GENERAL: PAINLEVEL: NO PAIN (0)

## 2024-05-09 NOTE — NURSING NOTE
"Chief Complaint   Patient presents with    Lipids     /74 (Cuff Size: Adult Large)   Pulse 58   Temp 97.4  F (36.3  C) (Tympanic)   Resp 18   Ht 1.695 m (5' 6.75\")   Wt 85.7 kg (189 lb)   SpO2 96%   BMI 29.82 kg/m   Estimated body mass index is 29.82 kg/m  as calculated from the following:    Height as of this encounter: 1.695 m (5' 6.75\").    Weight as of this encounter: 85.7 kg (189 lb).  Patient presents to the clinic using No DME      Health Maintenance that is potentially due pending provider review:    Health Maintenance Due   Topic Date Due    DTAP/TDAP/TD IMMUNIZATION (1 - Tdap) 01/02/1999    COVID-19 Vaccine (7 - 2023-24 season) 02/11/2024                  "

## 2024-05-09 NOTE — PROGRESS NOTES
"  Assessment & Plan     Mixed hyperlipidemia  Recommended to continue Lipitor.  Lipid and hepatic panel ordered.  Recommended regular exercise and healthy diet  - atorvastatin (LIPITOR) 20 MG tablet; Take 1 tablet (20 mg) by mouth daily  - Lipid panel; Future  - Hepatic panel (Albumin, ALT, AST, Bili, Alk Phos, TP); Future  - Lipid panel  - Hepatic panel (Albumin, ALT, AST, Bili, Alk Phos, TP)    Leukopenia, unspecified type  Repeat CBC order placed  - CBC with platelets; Future  - CBC with platelets    Screening for prostate cancer  - PSA, screen; Future  - PSA, screen    Ganglion cyst of left foot  Podiatry referral placed  - Orthopedic  Referral; Future    Bee sting reaction, undetermined intent, sequela  - EPINEPHrine (ADRENACLICK) 0.3 MG/0.3ML injection 2-pack; Inject 0.3 mLs (0.3 mg) into the muscle as needed for anaphylaxis      BMI  Estimated body mass index is 29.82 kg/m  as calculated from the following:    Height as of this encounter: 1.695 m (5' 6.75\").    Weight as of this encounter: 85.7 kg (189 lb).   Weight management plan: Discussed healthy diet and exercise guidelines      Alem Bourne is a 73 year old, presenting for the following health issues:  Lipids    History of Present Illness       Hyperlipidemia:  He presents for follow up of hyperlipidemia.   He is taking medication to lower cholesterol. He is not having myalgia or other side effects to statin medications.    He eats 2-3 servings of fruits and vegetables daily.He consumes 1 sweetened beverage(s) daily.He exercises with enough effort to increase his heart rate 10 to 19 minutes per day.  He exercises with enough effort to increase his heart rate 3 or less days per week.   He is taking medications regularly.       Review of Systems  Constitutional, neuro, ENT, endocrine, pulmonary, cardiac, gastrointestinal, genitourinary, musculoskeletal, integument and psychiatric systems are negative, except as otherwise noted.      Objective " "   /74 (Cuff Size: Adult Large)   Pulse 58   Temp 97.4  F (36.3  C) (Tympanic)   Resp 18   Ht 1.695 m (5' 6.75\")   Wt 85.7 kg (189 lb)   SpO2 96%   BMI 29.82 kg/m    Body mass index is 29.82 kg/m .  Physical Exam   GENERAL: alert and no distress  EYES: Eyes grossly normal to inspection, PERRL and conjunctivae and sclerae normal  NECK: no adenopathy, no asymmetry, masses, or scars  RESP: lungs clear to auscultation - no rales, rhonchi or wheezes  CV: regular rate and rhythm, normal S1 S2, no S3 or S4, no murmur, click or rub, no peripheral edema  MS: Left foot great toe ganglion cyst, no skin discoloration or tenderness noted  NEURO: Normal strength and tone, mentation intact and speech normal  PSYCH: mentation appears normal, affect normal/bright      Signed Electronically by: MD Paola Rubio.  Below patient canceled within appointment Hello goals organomegaly.  DVT pulmonary function good morning urinary good  "

## 2024-06-02 ENCOUNTER — OFFICE VISIT (OUTPATIENT)
Dept: URGENT CARE | Facility: URGENT CARE | Age: 74
End: 2024-06-02
Payer: COMMERCIAL

## 2024-06-02 VITALS
BODY MASS INDEX: 29.82 KG/M2 | WEIGHT: 190 LBS | TEMPERATURE: 100.6 F | SYSTOLIC BLOOD PRESSURE: 119 MMHG | HEART RATE: 85 BPM | OXYGEN SATURATION: 98 % | HEIGHT: 67 IN | DIASTOLIC BLOOD PRESSURE: 72 MMHG

## 2024-06-02 DIAGNOSIS — Z20.822 SUSPECTED 2019 NOVEL CORONAVIRUS INFECTION: Primary | ICD-10-CM

## 2024-06-02 DIAGNOSIS — R05.9 COUGH, UNSPECIFIED TYPE: ICD-10-CM

## 2024-06-02 LAB
DEPRECATED S PYO AG THROAT QL EIA: NEGATIVE
FLUAV AG SPEC QL IA: NEGATIVE
FLUBV AG SPEC QL IA: NEGATIVE
GROUP A STREP BY PCR: NOT DETECTED

## 2024-06-02 PROCEDURE — 87635 SARS-COV-2 COVID-19 AMP PRB: CPT | Performed by: FAMILY MEDICINE

## 2024-06-02 PROCEDURE — 87804 INFLUENZA ASSAY W/OPTIC: CPT | Performed by: FAMILY MEDICINE

## 2024-06-02 PROCEDURE — 99213 OFFICE O/P EST LOW 20 MIN: CPT | Performed by: FAMILY MEDICINE

## 2024-06-02 PROCEDURE — 87651 STREP A DNA AMP PROBE: CPT | Performed by: FAMILY MEDICINE

## 2024-06-02 NOTE — PROGRESS NOTES
"  Assessment & Plan     Suspected 2019 novel coronavirus infection  Differentials discussed in detail and suspect symptoms secondary to COVID-19 infection.  Wife diagnosed with confirmed COVID-19 infection last week.  Rapid strep and influenza test negative, COVID-19 test result pending.  Paxlovid prescribed.  Recommended well hydration, warm fluids, over-the-counter analgesia and to follow-up if symptoms persist or worsen.  Patient understood and in agreement with above plan.  All questions answered.  - nirmatrelvir and ritonavir (PAXLOVID) 300 mg/100 mg therapy pack; Take 3 tablets by mouth 2 times daily for 5 days    Cough, unspecified type  - Influenza A & B Antigen - Clinic Collect  - Streptococcus A Rapid Screen w/Reflex to PCR - Clinic Collect  - Symptomatic COVID-19 Virus (Coronavirus) by PCR Nose  - Group A Streptococcus PCR Throat Swab        Subjective   Steffen is a 74 year old, presenting for the following health issues:  Cough (Congestion,coughing,sore throat and fever-Sx present for x4 days)    HPI     Concern -   Onset: 4 days   Description: fever, chills, runny nose, cough congestion and fatigue.  Wife diagnosed with COVID-19 infection last week  Intensity: moderate  Progression of Symptoms:  same  Accompanying Signs & Symptoms: No chest pain, shortness of breath, night sweats or other relevant systemic symptoms  Previous history of similar problem: COVID-19 infection  Therapies tried and outcome: OTC cold meds      Review of Systems  Constitutional, HEENT, cardiovascular, pulmonary, gi and gu systems are negative, except as otherwise noted.      Objective    /72   Pulse 85   Temp (!) 100.6  F (38.1  C) (Tympanic)   Ht 1.695 m (5' 6.73\")   Wt 86.2 kg (190 lb)   SpO2 98%   BMI 30.00 kg/m    Body mass index is 30 kg/m .  Physical Exam   GENERAL: alert and tired appearing  EYES: Eyes grossly normal to inspection, PERRL and conjunctivae and sclerae normal  HENT: normal cephalic/atraumatic, ear " canals and TM's normal, nose and mouth without ulcers or lesions, oropharynx clear, and oral mucous membranes moist  NECK: no adenopathy, no asymmetry, masses, or scars  RESP: lungs clear to auscultation - no rales, rhonchi or wheezes  CV: regular rates and rhythm, normal S1 S2, no S3 or S4, and no murmur, click or rub  MS: no gross musculoskeletal defects noted, no edema  SKIN: no suspicious lesions or rashes  NEURO: Normal strength and tone, mentation intact and speech normal  PSYCH: mentation appears normal, affect normal/bright    Results for orders placed or performed in visit on 06/02/24   Influenza A & B Antigen - Clinic Collect     Status: Normal    Specimen: Nose; Swab   Result Value Ref Range    Influenza A antigen Negative Negative    Influenza B antigen Negative Negative    Narrative    Test results must be correlated with clinical data. If necessary, results should be confirmed by a molecular assay or viral culture.   Streptococcus A Rapid Screen w/Reflex to PCR - Clinic Collect     Status: Normal    Specimen: Throat; Swab   Result Value Ref Range    Group A Strep antigen Negative Negative           Signed Electronically by: Kris Godoy MD

## 2024-06-03 LAB — SARS-COV-2 RNA RESP QL NAA+PROBE: POSITIVE

## 2024-06-17 DIAGNOSIS — E78.2 MIXED HYPERLIPIDEMIA: ICD-10-CM

## 2024-06-17 RX ORDER — ATORVASTATIN CALCIUM 20 MG/1
20 TABLET, FILM COATED ORAL DAILY
Qty: 90 TABLET | Refills: 1 | Status: SHIPPED | OUTPATIENT
Start: 2024-06-17

## 2024-06-24 ENCOUNTER — OFFICE VISIT (OUTPATIENT)
Dept: ORTHOPEDICS | Facility: CLINIC | Age: 74
End: 2024-06-24
Payer: COMMERCIAL

## 2024-06-24 DIAGNOSIS — M67.472 GANGLION CYST OF LEFT FOOT: Primary | ICD-10-CM

## 2024-06-24 PROCEDURE — 20612 ASPIRATE/INJ GANGLION CYST: CPT | Mod: LT | Performed by: FAMILY MEDICINE

## 2024-06-24 PROCEDURE — 76942 ECHO GUIDE FOR BIOPSY: CPT | Performed by: FAMILY MEDICINE

## 2024-06-24 RX ORDER — BETAMETHASONE SODIUM PHOSPHATE AND BETAMETHASONE ACETATE 3; 3 MG/ML; MG/ML
6 INJECTION, SUSPENSION INTRA-ARTICULAR; INTRALESIONAL; INTRAMUSCULAR; SOFT TISSUE
Status: SHIPPED | OUTPATIENT
Start: 2024-06-24

## 2024-06-24 RX ORDER — ROPIVACAINE HYDROCHLORIDE 5 MG/ML
0.5 INJECTION, SOLUTION EPIDURAL; INFILTRATION; PERINEURAL
Status: SHIPPED | OUTPATIENT
Start: 2024-06-24

## 2024-06-24 RX ADMIN — ROPIVACAINE HYDROCHLORIDE 0.5 ML: 5 INJECTION, SOLUTION EPIDURAL; INFILTRATION; PERINEURAL at 08:22

## 2024-06-24 RX ADMIN — BETAMETHASONE SODIUM PHOSPHATE AND BETAMETHASONE ACETATE 6 MG: 3; 3 INJECTION, SUSPENSION INTRA-ARTICULAR; INTRALESIONAL; INTRAMUSCULAR; SOFT TISSUE at 08:22

## 2024-06-24 NOTE — LETTER
6/24/2024      Steffen Mitchell  [address]  [place] [address]      Dear Colleague,    Thank you for referring your patient, Steffen Mitchell, to the [facility] [place]. Please see a copy of my visit note below.    Small Joint Injection/Arthrocentesis: L great MTP    Date/Time: 6/24/2024 8:22 AM    Performed by: Tc Mock MD  Authorized by: Tc Mock MD  Indications:  Pain  Needle Size:  18 G  Guidance: ultrasound     Approach:  Dorsal  Location:  Great toe    Site:  L great MTP                    Medications:  6 mg betamethasone acet & sod phos 6 (3-3) MG/ML; 0.5 mL ROPivacaine 5 MG/ML   Medications comment:  Actual amount of celestone used 0.5 mL  Aspirate amount (mL):  2    Aspirate:  Clear     Aspirate comment:  Gel like      Outcome:  Tolerated well, no immediate complications  Procedure discussed: discussed risks, benefits, and alternatives    Consent Given by:  Patient  Timeout: timeout called immediately prior to procedure    Prep: patient was prepped and draped in usual sterile fashion       Ultrasound images of procedure were permanently stored.     Patient reported some improvement of pain after the numbing portion left 1st MTP joint aspiration/steroid injection.  Ultrasound guided images were permanently stored.  Aftercare instructions given to patient.  Plan to follow-up as previously discussed with referring provider.     Tc Mock MD [facility]              Again, thank you for allowing me to participate in the care of your patient.        Sincerely,        Tc Mock MD

## 2024-06-24 NOTE — PROGRESS NOTES
Small Joint Injection/Arthrocentesis: L great MTP    Date/Time: 6/24/2024 8:22 AM    Performed by: Tc Mock MD  Authorized by: Tc Mock MD  Indications:  Pain  Needle Size:  18 G  Guidance: ultrasound     Approach:  Dorsal  Location:  Great toe    Site:  L great MTP                    Medications:  6 mg betamethasone acet & sod phos 6 (3-3) MG/ML; 0.5 mL ROPivacaine 5 MG/ML   Medications comment:  Actual amount of celestone used 0.5 mL  Aspirate amount (mL):  2    Aspirate:  Clear     Aspirate comment:  Gel like      Outcome:  Tolerated well, no immediate complications  Procedure discussed: discussed risks, benefits, and alternatives    Consent Given by:  Patient  Timeout: timeout called immediately prior to procedure    Prep: patient was prepped and draped in usual sterile fashion       Ultrasound images of procedure were permanently stored.     Patient reported some improvement of pain after the numbing portion left 1st MTP joint aspiration/steroid injection.  Ultrasound guided images were permanently stored.  Aftercare instructions given to patient.  Plan to follow-up as previously discussed with referring provider.     Tc Mock MD [facility]

## 2024-06-24 NOTE — PATIENT INSTRUCTIONS
[facility] Injection Discharge Instructions    Procedure: Left 1st MTP Cyst Aspiration/Steroid    Follow-up: Dr. Miranda can consider procedure to remove cyst    You may shower, however avoid swimming, tub baths or hot tubs for 24 hours following your procedure  You may have a mild to moderate increase in pain for several days following the injection.  It may take up to 14 days for the steroid medication to start working although you may feel the effect as early as a few days after the procedure.  You may use ice packs for 10-15 minutes, 3 to 4 times a day at the injection site for comfort  You may use anti-inflammatory medications (such as Ibuprofen or Aleve or Advil) or Tylenol for pain control if necessary  If you were fasting, you may resume your normal diet and medications after the procedure  If you have diabetes, check your blood sugar more frequently than usual as your blood sugar may be higher than normal for 10-14 days following a steroid injection. Contact your doctor who manages your diabetes if your blood sugar is higher than usual    If you experience any of the following, call [facility] @ [phone] or [phone]  -Fever over 100 degree F  -Swelling, bleeding, redness, drainage, warmth at the injection site  - New or worsening pain     It was great seeing you again today!    Tc Mock

## 2024-09-26 ENCOUNTER — PATIENT OUTREACH (OUTPATIENT)
Dept: CARE COORDINATION | Facility: CLINIC | Age: 74
End: 2024-09-26
Payer: COMMERCIAL

## 2024-10-10 ENCOUNTER — PATIENT OUTREACH (OUTPATIENT)
Dept: CARE COORDINATION | Facility: CLINIC | Age: 74
End: 2024-10-10
Payer: COMMERCIAL

## 2024-12-29 ENCOUNTER — HEALTH MAINTENANCE LETTER (OUTPATIENT)
Age: 74
End: 2024-12-29

## 2024-12-31 DIAGNOSIS — E78.2 MIXED HYPERLIPIDEMIA: ICD-10-CM

## 2024-12-31 RX ORDER — ATORVASTATIN CALCIUM 20 MG/1
20 TABLET, FILM COATED ORAL DAILY
Qty: 90 TABLET | Refills: 0 | Status: SHIPPED | OUTPATIENT
Start: 2024-12-31

## 2024-12-31 NOTE — TELEPHONE ENCOUNTER
Needs appointment for further refills.   Prescription approved per [facility] Refill Protocol.  Julie Behrendt RN     Linear

## 2025-01-21 SDOH — HEALTH STABILITY: PHYSICAL HEALTH: ON AVERAGE, HOW MANY MINUTES DO YOU ENGAGE IN EXERCISE AT THIS LEVEL?: 60 MIN

## 2025-01-21 SDOH — HEALTH STABILITY: PHYSICAL HEALTH: ON AVERAGE, HOW MANY DAYS PER WEEK DO YOU ENGAGE IN MODERATE TO STRENUOUS EXERCISE (LIKE A BRISK WALK)?: 3 DAYS

## 2025-01-21 ASSESSMENT — SOCIAL DETERMINANTS OF HEALTH (SDOH): HOW OFTEN DO YOU GET TOGETHER WITH FRIENDS OR RELATIVES?: TWICE A WEEK

## 2025-01-22 ENCOUNTER — OFFICE VISIT (OUTPATIENT)
Dept: FAMILY MEDICINE | Facility: CLINIC | Age: 75
End: 2025-01-22
Payer: COMMERCIAL

## 2025-01-22 VITALS
WEIGHT: 187 LBS | DIASTOLIC BLOOD PRESSURE: 70 MMHG | BODY MASS INDEX: 30.05 KG/M2 | TEMPERATURE: 96.4 F | RESPIRATION RATE: 20 BRPM | OXYGEN SATURATION: 98 % | HEIGHT: 66 IN | SYSTOLIC BLOOD PRESSURE: 118 MMHG | HEART RATE: 54 BPM

## 2025-01-22 DIAGNOSIS — D72.819 LEUKOPENIA, UNSPECIFIED TYPE: ICD-10-CM

## 2025-01-22 DIAGNOSIS — Z00.00 ROUTINE HISTORY AND PHYSICAL EXAMINATION OF ADULT: Primary | ICD-10-CM

## 2025-01-22 DIAGNOSIS — E78.2 MIXED HYPERLIPIDEMIA: ICD-10-CM

## 2025-01-22 LAB
ANION GAP SERPL CALCULATED.3IONS-SCNC: 9 MMOL/L (ref 7–15)
BUN SERPL-MCNC: 17.2 MG/DL (ref 8–23)
CALCIUM SERPL-MCNC: 9.2 MG/DL (ref 8.8–10.4)
CHLORIDE SERPL-SCNC: 103 MMOL/L (ref 98–107)
CREAT SERPL-MCNC: 1 MG/DL (ref 0.67–1.17)
EGFRCR SERPLBLD CKD-EPI 2021: 79 ML/MIN/1.73M2
ERYTHROCYTE [DISTWIDTH] IN BLOOD BY AUTOMATED COUNT: 15 % (ref 10–15)
FOLATE SERPL-MCNC: 12.6 NG/ML (ref 4.6–34.8)
GLUCOSE SERPL-MCNC: 100 MG/DL (ref 70–99)
HCO3 SERPL-SCNC: 27 MMOL/L (ref 22–29)
HCT VFR BLD AUTO: 39.8 % (ref 40–53)
HGB BLD-MCNC: 13.2 G/DL (ref 13.3–17.7)
MCH RBC QN AUTO: 26.8 PG (ref 26.5–33)
MCHC RBC AUTO-ENTMCNC: 33.2 G/DL (ref 31.5–36.5)
MCV RBC AUTO: 81 FL (ref 78–100)
PLATELET # BLD AUTO: 189 10E3/UL (ref 150–450)
POTASSIUM SERPL-SCNC: 4.9 MMOL/L (ref 3.4–5.3)
RBC # BLD AUTO: 4.92 10E6/UL (ref 4.4–5.9)
SODIUM SERPL-SCNC: 139 MMOL/L (ref 135–145)
VIT B12 SERPL-MCNC: 317 PG/ML (ref 232–1245)
WBC # BLD AUTO: 4 10E3/UL (ref 4–11)

## 2025-01-22 PROCEDURE — 85027 COMPLETE CBC AUTOMATED: CPT | Performed by: FAMILY MEDICINE

## 2025-01-22 PROCEDURE — 36415 COLL VENOUS BLD VENIPUNCTURE: CPT | Performed by: FAMILY MEDICINE

## 2025-01-22 PROCEDURE — 82746 ASSAY OF FOLIC ACID SERUM: CPT | Performed by: FAMILY MEDICINE

## 2025-01-22 PROCEDURE — 82607 VITAMIN B-12: CPT | Performed by: FAMILY MEDICINE

## 2025-01-22 PROCEDURE — 99397 PER PM REEVAL EST PAT 65+ YR: CPT | Performed by: FAMILY MEDICINE

## 2025-01-22 PROCEDURE — 80048 BASIC METABOLIC PNL TOTAL CA: CPT | Performed by: FAMILY MEDICINE

## 2025-01-22 PROCEDURE — 99214 OFFICE O/P EST MOD 30 MIN: CPT | Mod: 25 | Performed by: FAMILY MEDICINE

## 2025-01-22 RX ORDER — ATORVASTATIN CALCIUM 20 MG/1
20 TABLET, FILM COATED ORAL DAILY
Qty: 90 TABLET | Refills: 3 | Status: SHIPPED | OUTPATIENT
Start: 2025-01-22

## 2025-01-22 ASSESSMENT — PAIN SCALES - GENERAL: PAINLEVEL_OUTOF10: NO PAIN (0)

## 2025-01-22 NOTE — PROGRESS NOTES
"Preventive Care Visit  [facility]  Kris Godoy MD, Family Medicine  Jan 22, 2025      Assessment & Plan     Routine history and physical examination of adult  Medically doing well.  Recommended regular exercise, regular diet and weight loss.    Mixed hyperlipidemia  Lipitor refilled  - atorvastatin (LIPITOR) 20 MG tablet; Take 1 tablet (20 mg) by mouth daily.    Leukopenia, unspecified type  CBC, BMP, B12 and folic acid ordered.  Will consider hematology consult.  - CBC with platelets; Future  - Basic metabolic panel  (Ca, Cl, CO2, Creat, Gluc, K, Na, BUN); Future  - Vitamin B12; Future  - Folate; Future      BMI  Estimated body mass index is 30.18 kg/m  as calculated from the following:    Height as of this encounter: 1.676 m (5' 6\").    Weight as of this encounter: 84.8 kg (187 lb).   Weight management plan: Discussed healthy diet and exercise guidelines    Counseling  Appropriate preventive services were addressed with this patient via screening, questionnaire, or discussion as appropriate for fall prevention, nutrition, physical activity, Tobacco-use cessation, social engagement, weight loss and cognition.  Checklist reviewing preventive services available has been given to the patient.  Reviewed patient's diet, addressing concerns and/or questions.   He is at risk for lack of exercise and has been provided with information to increase physical activity for the benefit of his well-being.   Patient reported safety concerns were addressed today.      Alem Bourne is a 74 year old, presenting for the following:  Physical        1/22/2025     8:26 AM   Additional Questions   Roomed by Ju NAJERA CMA       HPI  Hyperlipidemia Follow-Up  Are you regularly taking any medication or supplement to lower your cholesterol?   Yes- :Lipitor 20 mg  Are you having muscle aches or other side effects that you think could be caused by your cholesterol lowering medication?  No    Health Care " Directive  Patient does not have a Health Care Directive: Discussed advance care planning with patient; information given to patient to review.      1/21/2025   General Health   How would you rate your overall physical health? Excellent   Feel stress (tense, anxious, or unable to sleep) Not at all         1/21/2025   Nutrition   Diet: Regular (no restrictions)         1/21/2025   Exercise   Days per week of moderate/strenous exercise 3 days   Average minutes spent exercising at this level 60 min         1/21/2025   Social Factors   Frequency of gathering with friends or relatives Twice a week   Worry food won't last until get money to buy more No   Food not last or not have enough money for food? No   Do you have housing? (Housing is defined as stable permanent housing and does not include staying ouside in a car, in a tent, in an abandoned building, in an overnight shelter, or couch-surfing.) Yes   Are you worried about losing your housing? No   Lack of transportation? No   Unable to get utilities (heat,electricity)? No         1/21/2025   Fall Risk   Fallen 2 or more times in the past year? No   Trouble with walking or balance? No          1/21/2025   Activities of Daily Living- Home Safety   Needs help with the following daily activites None of the above   Safety concerns in the home No grab bars in the bathroom         1/21/2025   Dental   Dentist two times every year? Yes         1/21/2025   Hearing Screening   Hearing concerns? None of the above         1/21/2025   Driving Risk Screening   Patient/family members have concerns about driving No         1/21/2025   General Alertness/Fatigue Screening   Have you been more tired than usual lately? No         1/21/2025   Urinary Incontinence Screening   Bothered by leaking urine in past 6 months No         1/21/2025   TB Screening   Were you born outside of the [place]? No         Today's PHQ-2 Score:       1/21/2025    12:43 PM   PHQ-2 ( 1999 [employer])   Q1: Little  interest or pleasure in doing things 0   Q2: Feeling down, depressed or hopeless 0   PHQ-2 Score 0    Q1: Little interest or pleasure in doing things Not at all   Q2: Feeling down, depressed or hopeless Not at all   PHQ-2 Score 0       Patient-reported           2025   Substance Use   Alcohol more than 3/day or more than 7/wk No   Do you have a current opioid prescription? No   How severe/bad is pain from 1 to 10? 2/10   Do you use any other substances recreationally? No     Social History     Tobacco Use    Smoking status: Former     Current packs/day: 0.00     Average packs/day: 1 pack/day for 10.0 years (10.0 ttl pk-yrs)     Types: Cigarettes     Start date: 1966     Quit date: 1975     Years since quittin.0     Passive exposure: Past    Smokeless tobacco: Former     Quit date: 1980   Vaping Use    Vaping status: Never Used   Substance Use Topics    Alcohol use: Yes     Comment: occ has wine with dinner    Drug use: No       ASCVD Risk   The 10-year ASCVD risk score (Urmila JARRETT, et al., 2019) is: 18.1%    Values used to calculate the score:      Age: 74 years      Sex: Male      Is Non- : No      Diabetic: No      Tobacco smoker: No      Systolic Blood Pressure: 118 mmHg      Is BP treated: No      HDL Cholesterol: 47 mg/dL      Total Cholesterol: 131 mg/dL          Reviewed and updated as needed this visit by Provider                    Past Medical History:   Diagnosis Date    Arthritis      Past Surgical History:   Procedure Laterality Date    COLONOSCOPY N/A 2015    Procedure: COLONOSCOPY;  Surgeon: Carlos Ovalles MD;  Location: [facility]    COLONOSCOPY N/A 2024    Procedure: COLONOSCOPY, FLEXIBLE, WITH LESION REMOVAL USING SNARE;  Surgeon: Alexander Ca MD;  Location: [facility]    ORTHOPEDIC SURGERY      SURGICAL HISTORY OF -   2003     Colonoscopy and polypectomy with snare    SURGICAL HISTORY OF -   1984     Arthroscoopy  Arthroscopic medial meniscectomy Left Knee    SURGICAL HISTORY OF -   3/29/2002    Right Knee,partial medial meniscectomy      OB History   No obstetric history on file.     Lab work is in process  Labs reviewed in EPIC  BP Readings from Last 3 Encounters:   25 118/70   24 119/72   24 124/74    Wt Readings from Last 3 Encounters:   25 84.8 kg (187 lb)   24 86.2 kg (190 lb)   24 85.7 kg (189 lb)                  Patient Active Problem List   Diagnosis    ALLERGY   **  SEE ALSO ALLERGIC REAC BEE STING    Sensorineural hearing loss    Subjective tinnitus    CARDIOVASCULAR SCREENING; LDL GOAL LESS THAN 160    Screening for prostate cancer     Past Surgical History:   Procedure Laterality Date    COLONOSCOPY N/A 2015    Procedure: COLONOSCOPY;  Surgeon: Carlos Ovalles MD;  Location: [facility]    COLONOSCOPY N/A 2024    Procedure: COLONOSCOPY, FLEXIBLE, WITH LESION REMOVAL USING SNARE;  Surgeon: Alexander Ca MD;  Location: [facility]    ORTHOPEDIC SURGERY      SURGICAL HISTORY OF -   2003     Colonoscopy and polypectomy with snare    SURGICAL HISTORY OF -   1984     Arthroscoopy Arthroscopic medial meniscectomy Left Knee    SURGICAL HISTORY OF -   3/29/2002    Right Knee,partial medial meniscectomy        Social History     Tobacco Use    Smoking status: Former     Current packs/day: 0.00     Average packs/day: 1 pack/day for 10.0 years (10.0 ttl pk-yrs)     Types: Cigarettes     Start date: 1966     Quit date: 1975     Years since quittin.0     Passive exposure: Past    Smokeless tobacco: Former     Quit date: 1980   Substance Use Topics    Alcohol use: Yes     Comment: occ has wine with dinner     Family History   Problem Relation Age of Onset    Hypertension Mother     Cerebrovascular Disease Mother     Hypertension Father     Cerebrovascular Disease Father 70    Diabetes Father     Eye Disorder Father         glacoma    Coronary Artery Disease  Father     CHAD. Brother         bypass surgery at age 55    Cardiovascular Brother         pulmonary hypertention    Respiratory Brother         sleep apnea    Heart Disease Brother         triple bypass         Current Outpatient Medications   Medication Sig Dispense Refill    ASPIRIN 81 MG OR TABS 1 tab po QD (Once per day) 100 3    atorvastatin (LIPITOR) 20 MG tablet Take 1 tablet (20 mg) by mouth daily. 90 tablet 3    ketoconazole (NIZORAL) 2 % external shampoo Apply 5 to 10 mL to wet scalp, lather, leave on 3 to 5 minutes, and rinse; apply twice weekly for 2 to 4 weeks. 120 mL 1    EPINEPHrine (ADRENACLICK) 0.3 MG/0.3ML injection 2-pack Inject 0.3 mLs (0.3 mg) into the muscle as needed for anaphylaxis (Patient not taking: Reported on 1/22/2025) 0.6 mL 1     Allergies   Allergen Reactions    Wasp Venom Protein Anaphylaxis     Bee Stings    Bees      Bee Stings       Recent Labs   Lab Test 05/09/24  0742 10/26/23  0859 09/09/21  1437 09/05/21  2141 09/05/21  2141 02/12/19  0933 03/30/17  0954   LDL 73 145*  --   --   --  116*  --    HDL 47 46  --   --   --  41  --    TRIG 56 130  --   --   --  206*  --    ALT 25  --  33  --  32 33 21   CR  --  1.07 1.14   < > 1.25 1.01 0.96   GFRESTIMATED  --  73 64   < > 58* 76 78   GFRESTBLACK  --   --   --   --   --  88 >90   GFR Calc     POTASSIUM  --  4.9 4.6   < > 4.3 4.6 4.4   TSH  --  2.10  --   --   --   --   --     < > = values in this interval not displayed.      Current providers sharing in care for this patient include:  Patient Care Team:  Clinic - [facility] as PCP - Khalif Cespedes MD as MD (Dermatology)  Kris Godoy MD as Assigned PCP  Diego Carballo MD as MD (Neurology)  Carlos Miranda DPM as Assigned Surgical Provider  Tc Mock MD as Assigned Musculoskeletal Provider  Monica Kirk, PhD LP as Assigned Behavioral Health Provider    The following health maintenance items are  "reviewed in Epic and correct as of today:  Health Maintenance   Topic Date Due    DTAP/TDAP/TD IMMUNIZATION (1 - Tdap) 01/02/1999    MEDICARE ANNUAL WELLNESS VISIT  10/26/2024    ANNUAL REVIEW OF HM ORDERS  10/26/2024    LIPID  05/09/2025    FALL RISK ASSESSMENT  01/22/2026    GLUCOSE  10/26/2026    COLORECTAL CANCER SCREENING  02/05/2027    ADVANCE CARE PLANNING  10/26/2028    HEPATITIS C SCREENING  Completed    PHQ-2 (once per calendar year)  Completed    INFLUENZA VACCINE  Completed    Pneumococcal Vaccine: 50+ Years  Completed    ZOSTER IMMUNIZATION  Completed    RSV VACCINE  Completed    AORTIC ANEURYSM SCREENING (SYSTEM ASSIGNED)  Completed    COVID-19 Vaccine  Completed    HPV IMMUNIZATION  Aged Out    MENINGITIS IMMUNIZATION  Aged Out    RSV MONOCLONAL ANTIBODY  Aged Out         Review of Systems  Constitutional, neuro, ENT, endocrine, pulmonary, cardiac, gastrointestinal, genitourinary, musculoskeletal, integument and psychiatric systems are negative, except as otherwise noted.     Objective    Exam  /70 (BP Location: Right arm, Patient Position: Sitting, Cuff Size: Adult Large)   Pulse 54   Temp (!) 96.4  F (35.8  C) (Tympanic)   Resp 20   Ht 1.676 m (5' 6\")   Wt 84.8 kg (187 lb)   SpO2 98%   BMI 30.18 kg/m     Estimated body mass index is 30.18 kg/m  as calculated from the following:    Height as of this encounter: 1.676 m (5' 6\").    Weight as of this encounter: 84.8 kg (187 lb).    Physical Exam  GENERAL: alert and no distress  EYES: Eyes grossly normal to inspection, PERRL and conjunctivae and sclerae normal  HENT: normal cephalic/atraumatic, nose and mouth without ulcers or lesions, oropharynx clear, and oral mucous membranes moist  NECK: no adenopathy, no asymmetry, masses, or scars  RESP: lungs clear to auscultation - no rales, rhonchi or wheezes  CV: regular rate and rhythm, normal S1 S2, no S3 or S4, no murmur, click or rub, no peripheral edema  ABDOMEN: soft, nontender, no " hepatosplenomegaly, no masses and bowel sounds normal  MS: no gross musculoskeletal defects noted, no edema  SKIN: no suspicious lesions or rashes  NEURO: Normal strength and tone, mentation intact and speech normal  PSYCH: mentation appears normal, affect normal/bright        1/22/2025   Mini Cog   Clock Draw Score 2 Normal   3 Item Recall 2 objects recalled   Mini Cog Total Score 4              Signed Electronically by: Kris Godoy MD

## 2025-01-23 DIAGNOSIS — D64.9 MILD ANEMIA: Primary | ICD-10-CM

## 2025-03-29 ENCOUNTER — HEALTH MAINTENANCE LETTER (OUTPATIENT)
Age: 75
End: 2025-03-29

## 2025-05-04 ENCOUNTER — MYC REFILL (OUTPATIENT)
Dept: FAMILY MEDICINE | Facility: CLINIC | Age: 75
End: 2025-05-04
Payer: COMMERCIAL

## 2025-05-04 DIAGNOSIS — T63.444S BEE STING REACTION, UNDETERMINED INTENT, SEQUELA: ICD-10-CM

## 2025-05-05 RX ORDER — EPINEPHRINE 0.3 MG/.3ML
0.3 INJECTION SUBCUTANEOUS PRN
Qty: 0.6 ML | Refills: 1 | Status: SHIPPED | OUTPATIENT
Start: 2025-05-05

## 2025-06-18 ENCOUNTER — OFFICE VISIT (OUTPATIENT)
Dept: FAMILY MEDICINE | Facility: CLINIC | Age: 75
End: 2025-06-18
Payer: COMMERCIAL

## 2025-06-18 VITALS
WEIGHT: 184 LBS | HEART RATE: 70 BPM | BODY MASS INDEX: 29.57 KG/M2 | HEIGHT: 66 IN | DIASTOLIC BLOOD PRESSURE: 64 MMHG | RESPIRATION RATE: 22 BRPM | TEMPERATURE: 96.8 F | OXYGEN SATURATION: 98 % | SYSTOLIC BLOOD PRESSURE: 116 MMHG

## 2025-06-18 DIAGNOSIS — S30.860A TICK BITE OF BACK, INITIAL ENCOUNTER: Primary | ICD-10-CM
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
## 2025-06-18 PROCEDURE — 3078F DIAST BP <80 MM HG: CPT | Performed by: FAMILY MEDICINE

## 2025-06-18 PROCEDURE — 1126F AMNT PAIN NOTED NONE PRSNT: CPT | Performed by: FAMILY MEDICINE

## 2025-06-18 PROCEDURE — 3074F SYST BP LT 130 MM HG: CPT | Performed by: FAMILY MEDICINE

## 2025-06-18 PROCEDURE — 99213 OFFICE O/P EST LOW 20 MIN: CPT | Performed by: FAMILY MEDICINE

## 2025-06-18 RX ORDER — DOXYCYCLINE HYCLATE 100 MG
100 TABLET ORAL 2 TIMES DAILY
Qty: 20 TABLET | Refills: 0 | Status: SHIPPED | OUTPATIENT
Start: 2025-06-18 | End: 2025-06-28

## 2025-06-18 ASSESSMENT — PAIN SCALES - GENERAL: PAINLEVEL_OUTOF10: NO PAIN (0)

## 2025-06-18 NOTE — PROGRESS NOTES
"  Assessment & Plan     Tick bite of back, initial encounter  Management options discussed and shared decision made to start antibiotic for possible Lyme's disease, skin infection.  Return criteria explained in detail.  All questions answered.  - doxycycline hyclate (VIBRA-TABS) 100 MG tablet; Take 1 tablet (100 mg) by mouth 2 times daily for 10 days.      Alem Bourne is a 75 year old, presenting for the following health issues:  Tick Bite (back)        6/18/2025     7:26 AM   Additional Questions   Roomed by Ju NAJERA CMA     History of Present Illness       Reason for visit:  Tick bite-back  Symptom onset:  3-7 days ago  Symptoms include:  Red, inflamed, was really itchy-but not really itchy anymore  Symptom intensity:  Mild  Symptom progression:  Staying the same  Had these symptoms before:  No  What makes it worse:  No  What makes it better:  No   He is taking medications regularly.  Unsure about type of tick or how long it remain engorged       Review of Systems  Constitutional, HEENT, cardiovascular, pulmonary, gi and gu systems are negative, except as otherwise noted.      Objective    /64 (BP Location: Right arm, Patient Position: Sitting, Cuff Size: Adult Regular)   Pulse 70   Temp 96.8  F (36  C) (Tympanic)   Resp 22   Ht 1.676 m (5' 6\")   Wt 83.5 kg (184 lb)   SpO2 98%   BMI 29.70 kg/m    Body mass index is 29.7 kg/m .  Physical Exam   GENERAL: alert and no distress  EYES: Eyes grossly normal to inspection, PERRL and conjunctivae and sclerae normal  RESP: no wheezes  MS: no gross musculoskeletal defects noted, no edema  SKIN: Erythematous rash under left shoulder as shown below, no tenderness, fluctuance or discharge noted  NEURO: Normal strength and tone, mentation intact and speech normal  PSYCH: mentation appears normal, affect normal/bright        Signed Electronically by: Kris Godoy MD    "

## (undated) DEVICE — DEVICE RETRIEVAL ROTH NET PLATINUM UNIV 2.5MMX230CM 00715050

## (undated) DEVICE — ENDO SNARE EXACTO COLD 9MM LOOP 2.4MMX230CM 00711115

## (undated) RX ORDER — PROPOFOL 10 MG/ML
INJECTION, EMULSION INTRAVENOUS
Status: DISPENSED
Start: 2024-02-05

## (undated) RX ORDER — GLYCOPYRROLATE 0.2 MG/ML
INJECTION, SOLUTION INTRAMUSCULAR; INTRAVENOUS
Status: DISPENSED
Start: 2024-02-05